# Patient Record
Sex: MALE | Race: BLACK OR AFRICAN AMERICAN | NOT HISPANIC OR LATINO | Employment: FULL TIME | URBAN - METROPOLITAN AREA
[De-identification: names, ages, dates, MRNs, and addresses within clinical notes are randomized per-mention and may not be internally consistent; named-entity substitution may affect disease eponyms.]

---

## 2023-03-22 LAB
ANION GAP IN SER/PLAS: 11 MMOL/L (ref 10–20)
CALCIUM (MG/DL) IN SER/PLAS: 9.6 MG/DL (ref 8.6–10.6)
CARBON DIOXIDE, TOTAL (MMOL/L) IN SER/PLAS: 30 MMOL/L (ref 21–32)
CHLORIDE (MMOL/L) IN SER/PLAS: 107 MMOL/L (ref 98–107)
CREATININE (MG/DL) IN SER/PLAS: 0.87 MG/DL (ref 0.5–1.3)
GFR MALE: >90 ML/MIN/1.73M2
GLUCOSE (MG/DL) IN SER/PLAS: 91 MG/DL (ref 74–99)
POTASSIUM (MMOL/L) IN SER/PLAS: 4 MMOL/L (ref 3.5–5.3)
SODIUM (MMOL/L) IN SER/PLAS: 144 MMOL/L (ref 136–145)
UREA NITROGEN (MG/DL) IN SER/PLAS: 12 MG/DL (ref 6–23)

## 2023-04-13 ENCOUNTER — TELEMEDICINE (OUTPATIENT)
Dept: PRIMARY CARE | Facility: CLINIC | Age: 68
End: 2023-04-13
Payer: MEDICARE

## 2023-04-13 DIAGNOSIS — U07.1 COVID-19: Primary | ICD-10-CM

## 2023-04-13 PROCEDURE — 99213 OFFICE O/P EST LOW 20 MIN: CPT | Performed by: INTERNAL MEDICINE

## 2023-04-13 RX ORDER — AZITHROMYCIN 250 MG/1
TABLET, FILM COATED ORAL
Qty: 6 TABLET | Refills: 0 | Status: SHIPPED | OUTPATIENT
Start: 2023-04-13 | End: 2023-04-18

## 2023-04-13 NOTE — PROGRESS NOTES
VIRTUAL VISIT    NAME OF THE PATIENT: Reid, Duane O    YOB: 1955    CHIEF COMPLAINT:  Mr. Duane Reid today came here for multiple medical issues.  Cough, yellow sputum, sinus congestion, bronchitis going on for the last several days.  He had a COVID positive a week ago.  His wife has similar issue.  He came here for follow-up.    PAST MEDICAL HISTORY:  Reviewed on EMR, unchanged.    CURRENT MEDICATIONS:  Reviewed on EMR, unchanged.  List reviewed.    ALLERGIES:  Reviewed on EMR, unchanged.    SOCIAL HISTORY:  Reviewed on EMR, unchanged.  He does not smoke and does not drink alcohol.    FAMILY HISTORY:  Reviewed on EMR, unchanged.    REVIEW OF SYSTEMS:  All 12 systems reviewed and pertaining covered in history and physical.    PHYSICAL EXAMINATION  Virtual exam.  Nose and throat congested.  Postnasal drip.  Bilateral wheezing present.    LAB WORK:  Laboratory testing discussed.    ASSESSMENT AND PLAN:  Rhinosinusitis, pharyngitis, and bronchitis.  Z-FALLON, Claritin, Robitussin, Flonase.  Supportive care.  Follow up in two weeks if not better.  Total time taken 22 minutes, more than half in direct patient care.      Kindly review this note in conjunction with EMR.   Subjective   Patient ID: Duane O Reid is a 67 y.o. male who presents for No chief complaint on file..      HPI    Review of Systems    Objective   There were no vitals taken for this visit.      Physical Exam    Assessment/Plan   Problem List Items Addressed This Visit    None  Visit Diagnoses       COVID-19    -  Primary

## 2023-05-30 DIAGNOSIS — I10 BENIGN ESSENTIAL HYPERTENSION: ICD-10-CM

## 2023-05-30 PROBLEM — H43.399 VITREOUS FLOATERS: Status: ACTIVE | Noted: 2023-05-30

## 2023-05-30 PROBLEM — N52.9 ERECTILE DYSFUNCTION: Status: ACTIVE | Noted: 2023-05-30

## 2023-05-30 PROBLEM — N41.9 PROSTATITIS: Status: ACTIVE | Noted: 2023-05-30
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
## 2023-05-30 PROBLEM — K21.9 ESOPHAGEAL REFLUX: Status: ACTIVE | Noted: 2023-05-30

## 2023-05-30 PROBLEM — M54.50 LOWER BACK PAIN: Status: ACTIVE | Noted: 2023-05-30

## 2023-05-30 PROBLEM — R35.1 NOCTURIA: Status: ACTIVE | Noted: 2023-05-30

## 2023-05-30 PROBLEM — L72.9 SCALP CYST: Status: ACTIVE | Noted: 2023-05-30

## 2023-05-30 PROBLEM — K11.8 MASS OF PAROTID GLAND: Status: ACTIVE | Noted: 2023-05-30

## 2023-05-30 PROBLEM — N40.0 BPH (BENIGN PROSTATIC HYPERPLASIA): Status: ACTIVE | Noted: 2023-05-30

## 2023-05-30 PROBLEM — E78.5 HYPERLIPIDEMIA: Status: ACTIVE | Noted: 2023-05-30

## 2023-05-30 PROBLEM — H61.899 DRY EAR CANAL: Status: ACTIVE | Noted: 2023-05-30

## 2023-05-30 PROBLEM — E55.9 VITAMIN D DEFICIENCY: Status: ACTIVE | Noted: 2023-05-30

## 2023-05-30 PROBLEM — M51.27 HERNIATED NUCLEUS PULPOSUS, L5-S1, LEFT: Status: ACTIVE | Noted: 2023-05-30

## 2023-05-30 PROBLEM — T66.XXXA ADVERSE EFFECT OF RADIATION: Status: ACTIVE | Noted: 2023-05-30

## 2023-05-30 PROBLEM — R07.9 CHEST PAIN: Status: ACTIVE | Noted: 2023-05-30

## 2023-05-30 PROBLEM — H93.8X9 LUMP OF EAR: Status: ACTIVE | Noted: 2023-05-30

## 2023-05-30 PROBLEM — T78.40XA ALLERGIC REACTION: Status: ACTIVE | Noted: 2023-05-30

## 2023-05-30 PROBLEM — H53.8 BLURRED VISION, BILATERAL: Status: ACTIVE | Noted: 2023-05-30

## 2023-05-30 PROBLEM — D64.9 ANEMIA: Status: ACTIVE | Noted: 2023-05-30

## 2023-05-30 PROBLEM — H25.10 NUCLEAR SCLEROSIS: Status: ACTIVE | Noted: 2023-05-30

## 2023-05-30 PROBLEM — H61.23 BILATERAL IMPACTED CERUMEN: Status: ACTIVE | Noted: 2023-05-30

## 2023-05-30 PROBLEM — R39.9 LOWER URINARY TRACT SYMPTOMS (LUTS): Status: ACTIVE | Noted: 2023-05-30

## 2023-05-30 PROBLEM — J32.9 SINUSITIS: Status: ACTIVE | Noted: 2023-05-30

## 2023-05-30 PROBLEM — N40.0 ENLARGED PROSTATE WITHOUT LOWER URINARY TRACT SYMPTOMS (LUTS): Status: ACTIVE | Noted: 2023-05-30

## 2023-05-30 PROBLEM — H35.372 EPIRETINAL MEMBRANE (ERM) OF LEFT EYE: Status: ACTIVE | Noted: 2023-05-30

## 2023-05-30 PROBLEM — R94.31 ABNORMAL EKG: Status: ACTIVE | Noted: 2023-05-30

## 2023-05-30 PROBLEM — M79.89 LEG SWELLING: Status: ACTIVE | Noted: 2023-05-30

## 2023-05-30 PROBLEM — E03.9 HYPOTHYROIDISM: Status: ACTIVE | Noted: 2023-05-30

## 2023-05-30 PROBLEM — C07: Status: ACTIVE | Noted: 2023-05-30

## 2023-05-30 PROBLEM — H43.813 PVD (POSTERIOR VITREOUS DETACHMENT), BOTH EYES: Status: ACTIVE | Noted: 2023-05-30

## 2023-05-30 PROBLEM — N40.1 ENLARGED PROSTATE WITH LOWER URINARY TRACT SYMPTOMS (LUTS): Status: ACTIVE | Noted: 2023-05-30

## 2023-05-30 PROBLEM — R91.1 PULMONARY NODULE: Status: ACTIVE | Noted: 2023-05-30

## 2023-05-30 PROBLEM — K44.9 HIATAL HERNIA: Status: ACTIVE | Noted: 2023-05-30

## 2023-05-30 PROBLEM — R42 DIZZINESS: Status: ACTIVE | Noted: 2023-05-30

## 2023-05-30 PROBLEM — K11.7 XEROSTOMIA DUE TO RADIOTHERAPY: Status: ACTIVE | Noted: 2023-05-30

## 2023-05-30 PROBLEM — Z97.3 WEARS EYEGLASSES: Status: ACTIVE | Noted: 2023-05-30

## 2023-05-30 PROBLEM — H18.419 ARCUS SENILIS OF EYE: Status: ACTIVE | Noted: 2023-05-30

## 2023-05-30 PROBLEM — H10.10 ALLERGIC CONJUNCTIVITIS: Status: ACTIVE | Noted: 2023-05-30

## 2023-05-30 PROBLEM — H52.7 REFRACTIVE ERROR: Status: ACTIVE | Noted: 2023-05-30

## 2023-05-30 PROBLEM — J30.9 ALLERGIC RHINITIS: Status: ACTIVE | Noted: 2023-05-30

## 2023-05-30 PROBLEM — G56.00 CARPAL TUNNEL SYNDROME: Status: ACTIVE | Noted: 2023-05-30

## 2023-05-30 PROBLEM — H60.8X3 CHRONIC ECZEMATOUS OTITIS EXTERNA OF BOTH EARS: Status: ACTIVE | Noted: 2023-05-30

## 2023-05-30 PROBLEM — N32.81 OVERACTIVE BLADDER: Status: ACTIVE | Noted: 2023-05-30

## 2023-05-30 PROBLEM — R91.8 LUNG NODULE, MULTIPLE: Status: ACTIVE | Noted: 2023-05-30

## 2023-05-30 PROBLEM — H26.9 CATARACTS, BOTH EYES: Status: ACTIVE | Noted: 2023-05-30

## 2023-05-30 RX ORDER — DUTASTERIDE 0.5 MG/1
0.5 CAPSULE, LIQUID FILLED ORAL DAILY
COMMUNITY
End: 2023-05-30 | Stop reason: SDUPTHER

## 2023-05-30 RX ORDER — DUTASTERIDE 0.5 MG/1
0.5 CAPSULE, LIQUID FILLED ORAL DAILY
Qty: 30 CAPSULE | Refills: 0 | Status: SHIPPED | OUTPATIENT
Start: 2023-05-30 | End: 2023-07-03 | Stop reason: SDUPTHER

## 2023-06-07 LAB
ALANINE AMINOTRANSFERASE (SGPT) (U/L) IN SER/PLAS: 17 U/L (ref 10–52)
ALBUMIN (G/DL) IN SER/PLAS: 4.3 G/DL (ref 3.4–5)
ALKALINE PHOSPHATASE (U/L) IN SER/PLAS: 49 U/L (ref 33–136)
ANION GAP IN SER/PLAS: 12 MMOL/L (ref 10–20)
ASPARTATE AMINOTRANSFERASE (SGOT) (U/L) IN SER/PLAS: 14 U/L (ref 9–39)
BILIRUBIN TOTAL (MG/DL) IN SER/PLAS: 1.5 MG/DL (ref 0–1.2)
CALCIUM (MG/DL) IN SER/PLAS: 9.4 MG/DL (ref 8.6–10.6)
CARBON DIOXIDE, TOTAL (MMOL/L) IN SER/PLAS: 28 MMOL/L (ref 21–32)
CHLORIDE (MMOL/L) IN SER/PLAS: 107 MMOL/L (ref 98–107)
CHOLESTEROL (MG/DL) IN SER/PLAS: 183 MG/DL (ref 0–199)
CHOLESTEROL IN HDL (MG/DL) IN SER/PLAS: 66.6 MG/DL
CHOLESTEROL/HDL RATIO: 2.7
CREATININE (MG/DL) IN SER/PLAS: 0.87 MG/DL (ref 0.5–1.3)
GFR MALE: >90 ML/MIN/1.73M2
GLUCOSE (MG/DL) IN SER/PLAS: 106 MG/DL (ref 74–99)
LDL: 103 MG/DL (ref 0–99)
POTASSIUM (MMOL/L) IN SER/PLAS: 4 MMOL/L (ref 3.5–5.3)
PROTEIN TOTAL: 6.6 G/DL (ref 6.4–8.2)
SODIUM (MMOL/L) IN SER/PLAS: 143 MMOL/L (ref 136–145)
TRIGLYCERIDE (MG/DL) IN SER/PLAS: 68 MG/DL (ref 0–149)
UREA NITROGEN (MG/DL) IN SER/PLAS: 19 MG/DL (ref 6–23)
VLDL: 14 MG/DL (ref 0–40)

## 2023-06-15 ENCOUNTER — TELEPHONE (OUTPATIENT)
Dept: PRIMARY CARE | Facility: CLINIC | Age: 68
End: 2023-06-15
Payer: MEDICARE

## 2023-07-03 DIAGNOSIS — I10 BENIGN ESSENTIAL HYPERTENSION: ICD-10-CM

## 2023-07-03 RX ORDER — DUTASTERIDE 0.5 MG/1
0.5 CAPSULE, LIQUID FILLED ORAL DAILY
Qty: 30 CAPSULE | Refills: 0 | Status: SHIPPED | OUTPATIENT
Start: 2023-07-03 | End: 2023-10-19 | Stop reason: ALTCHOICE

## 2023-09-01 ENCOUNTER — OFFICE VISIT (OUTPATIENT)
Dept: PRIMARY CARE | Facility: CLINIC | Age: 68
End: 2023-09-01
Payer: MEDICARE

## 2023-09-01 ENCOUNTER — LAB (OUTPATIENT)
Dept: LAB | Facility: LAB | Age: 68
End: 2023-09-01
Payer: MEDICARE

## 2023-09-01 VITALS
BODY MASS INDEX: 30.05 KG/M2 | WEIGHT: 187 LBS | HEIGHT: 66 IN | DIASTOLIC BLOOD PRESSURE: 72 MMHG | SYSTOLIC BLOOD PRESSURE: 122 MMHG

## 2023-09-01 DIAGNOSIS — E78.5 HYPERLIPIDEMIA, UNSPECIFIED HYPERLIPIDEMIA TYPE: ICD-10-CM

## 2023-09-01 DIAGNOSIS — R73.03 PRE-DIABETES: ICD-10-CM

## 2023-09-01 DIAGNOSIS — I10 BENIGN ESSENTIAL HYPERTENSION: ICD-10-CM

## 2023-09-01 DIAGNOSIS — M19.90 ARTHRITIS: ICD-10-CM

## 2023-09-01 DIAGNOSIS — E03.9 HYPOTHYROIDISM, UNSPECIFIED TYPE: ICD-10-CM

## 2023-09-01 DIAGNOSIS — R51.9 INTRACTABLE HEADACHE, UNSPECIFIED CHRONICITY PATTERN, UNSPECIFIED HEADACHE TYPE: ICD-10-CM

## 2023-09-01 LAB
ALANINE AMINOTRANSFERASE (SGPT) (U/L) IN SER/PLAS: 17 U/L (ref 10–52)
ALBUMIN (G/DL) IN SER/PLAS: 4.1 G/DL (ref 3.4–5)
ALKALINE PHOSPHATASE (U/L) IN SER/PLAS: 61 U/L (ref 33–136)
ANION GAP IN SER/PLAS: 11 MMOL/L (ref 10–20)
APPEARANCE, URINE: CLEAR
ASPARTATE AMINOTRANSFERASE (SGOT) (U/L) IN SER/PLAS: 17 U/L (ref 9–39)
BILIRUBIN TOTAL (MG/DL) IN SER/PLAS: 1.2 MG/DL (ref 0–1.2)
BILIRUBIN, URINE: NEGATIVE
BLOOD, URINE: NEGATIVE
CALCIUM (MG/DL) IN SER/PLAS: 9.8 MG/DL (ref 8.6–10.6)
CARBON DIOXIDE, TOTAL (MMOL/L) IN SER/PLAS: 29 MMOL/L (ref 21–32)
CHLORIDE (MMOL/L) IN SER/PLAS: 107 MMOL/L (ref 98–107)
CHOLESTEROL (MG/DL) IN SER/PLAS: 158 MG/DL (ref 0–199)
CHOLESTEROL IN HDL (MG/DL) IN SER/PLAS: 59.3 MG/DL
CHOLESTEROL/HDL RATIO: 2.7
COLOR, URINE: YELLOW
CREATININE (MG/DL) IN SER/PLAS: 0.8 MG/DL (ref 0.5–1.3)
ERYTHROCYTE DISTRIBUTION WIDTH (RATIO) BY AUTOMATED COUNT: 12.3 % (ref 11.5–14.5)
ERYTHROCYTE MEAN CORPUSCULAR HEMOGLOBIN CONCENTRATION (G/DL) BY AUTOMATED: 32.3 G/DL (ref 32–36)
ERYTHROCYTE MEAN CORPUSCULAR VOLUME (FL) BY AUTOMATED COUNT: 87 FL (ref 80–100)
ERYTHROCYTES (10*6/UL) IN BLOOD BY AUTOMATED COUNT: 4.28 X10E12/L (ref 4.5–5.9)
ESTIMATED AVERAGE GLUCOSE FOR HBA1C: 91 MG/DL
GFR MALE: >90 ML/MIN/1.73M2
GLUCOSE (MG/DL) IN SER/PLAS: 99 MG/DL (ref 74–99)
GLUCOSE, URINE: NEGATIVE MG/DL
HEMATOCRIT (%) IN BLOOD BY AUTOMATED COUNT: 37.1 % (ref 41–52)
HEMOGLOBIN (G/DL) IN BLOOD: 12 G/DL (ref 13.5–17.5)
HEMOGLOBIN A1C/HEMOGLOBIN TOTAL IN BLOOD: 4.8 %
KETONES, URINE: NEGATIVE MG/DL
LDL: 82 MG/DL (ref 0–99)
LEUKOCYTE ESTERASE, URINE: NEGATIVE
LEUKOCYTES (10*3/UL) IN BLOOD BY AUTOMATED COUNT: 4.7 X10E9/L (ref 4.4–11.3)
MUCUS, URINE: NORMAL /LPF
NITRITE, URINE: NEGATIVE
NRBC (PER 100 WBCS) BY AUTOMATED COUNT: 0 /100 WBC (ref 0–0)
PH, URINE: 6 (ref 5–8)
PLATELETS (10*3/UL) IN BLOOD AUTOMATED COUNT: 241 X10E9/L (ref 150–450)
POTASSIUM (MMOL/L) IN SER/PLAS: 4.2 MMOL/L (ref 3.5–5.3)
PROTEIN TOTAL: 6.3 G/DL (ref 6.4–8.2)
PROTEIN, URINE: ABNORMAL MG/DL
RBC, URINE: <1 /HPF (ref 0–5)
RHEUMATOID FACTOR (IU/ML) IN SERUM OR PLASMA: <10 IU/ML (ref 0–15)
SEDIMENTATION RATE, ERYTHROCYTE: 8 MM/H (ref 0–20)
SODIUM (MMOL/L) IN SER/PLAS: 143 MMOL/L (ref 136–145)
SPECIFIC GRAVITY, URINE: 1.01 (ref 1–1.03)
THYROTROPIN (MIU/L) IN SER/PLAS BY DETECTION LIMIT <= 0.05 MIU/L: 1.02 MIU/L (ref 0.44–3.98)
TRIGLYCERIDE (MG/DL) IN SER/PLAS: 85 MG/DL (ref 0–149)
URATE (MG/DL) IN SER/PLAS: 3.1 MG/DL (ref 4–7.5)
UREA NITROGEN (MG/DL) IN SER/PLAS: 9 MG/DL (ref 6–23)
UROBILINOGEN, URINE: <2 MG/DL (ref 0–1.9)
VLDL: 17 MG/DL (ref 0–40)
WBC, URINE: <1 /HPF (ref 0–5)

## 2023-09-01 PROCEDURE — 84443 ASSAY THYROID STIM HORMONE: CPT

## 2023-09-01 PROCEDURE — 80053 COMPREHEN METABOLIC PANEL: CPT

## 2023-09-01 PROCEDURE — 36415 COLL VENOUS BLD VENIPUNCTURE: CPT

## 2023-09-01 PROCEDURE — 85027 COMPLETE CBC AUTOMATED: CPT

## 2023-09-01 PROCEDURE — 3078F DIAST BP <80 MM HG: CPT | Performed by: INTERNAL MEDICINE

## 2023-09-01 PROCEDURE — 86038 ANTINUCLEAR ANTIBODIES: CPT

## 2023-09-01 PROCEDURE — 3074F SYST BP LT 130 MM HG: CPT | Performed by: INTERNAL MEDICINE

## 2023-09-01 PROCEDURE — 83036 HEMOGLOBIN GLYCOSYLATED A1C: CPT

## 2023-09-01 PROCEDURE — 1159F MED LIST DOCD IN RCRD: CPT | Performed by: INTERNAL MEDICINE

## 2023-09-01 PROCEDURE — 86431 RHEUMATOID FACTOR QUANT: CPT

## 2023-09-01 PROCEDURE — 80061 LIPID PANEL: CPT

## 2023-09-01 PROCEDURE — 85652 RBC SED RATE AUTOMATED: CPT

## 2023-09-01 PROCEDURE — 1126F AMNT PAIN NOTED NONE PRSNT: CPT | Performed by: INTERNAL MEDICINE

## 2023-09-01 PROCEDURE — 84550 ASSAY OF BLOOD/URIC ACID: CPT

## 2023-09-01 PROCEDURE — 99214 OFFICE O/P EST MOD 30 MIN: CPT | Performed by: INTERNAL MEDICINE

## 2023-09-01 PROCEDURE — 81001 URINALYSIS AUTO W/SCOPE: CPT

## 2023-09-01 ASSESSMENT — ENCOUNTER SYMPTOMS
PARESIS: 0
TINGLING: 1
NUMBNESS: 1
FEVER: 0
BOWEL INCONTINENCE: 0
PARESTHESIAS: 1
OCCASIONAL FEELINGS OF UNSTEADINESS: 0
WEAKNESS: 0
LOSS OF SENSATION IN FEET: 0
PERIANAL NUMBNESS: 0
WEIGHT LOSS: 0
BACK PAIN: 1
DYSURIA: 0
ABDOMINAL PAIN: 0
HEADACHES: 0
DEPRESSION: 0
LEG PAIN: 1

## 2023-09-01 NOTE — PROGRESS NOTES
OFFICE NOTE    NAME OF THE PATIENT: Reid, Duane O    YOB: 1955    CHIEF COMPLAINT:  Duane Reid today came here for multiple medical issues.  He is waking up three to four times for pee-pee, Avodart not helping.  He is going for reverse shoulder surgery with Dr. Hsieh.  He had a two head injuries with history of cancer.  He is worried about it.  He gets headache on and off.  He sees blue lights.  He hit the head a couple of times.  It is not a Workmen Comp Case.  He came here for follow-up on various conditions.    PAST MEDICAL HISTORY:  Reviewed on EMR, unchanged.    CURRENT MEDICATIONS:  Reviewed on EMR, unchanged.  List reviewed.    ALLERGIES:  Reviewed on EMR, unchanged.    SOCIAL HISTORY:  Reviewed on EMR, unchanged.  He does not smoke and does not drink alcohol.    FAMILY HISTORY:  Reviewed on EMR, unchanged.    REVIEW OF SYSTEMS:  All 12 systems reviewed and pertaining covered in history and physical.    PHYSICAL EXAMINATION  VITAL SIGNS:  As recorded and reviewed from EMR.  RESPIRATORY:  The patient had normal inspirations and expirations.  The breath sounds were equal bilaterally and clear to auscultation.  CARDIOVASCULAR:  The patient had S1 normal, split S2 without obvious rubs, clicks, or murmurs.    GASTROINTESTINAL:  There was no hepatosplenomegaly.  There were no palpable masses and no inguinal nodes.  EXTREMITIES:  Legs had no edema.  NEUROLOGIC:  The patient had normal cranial nerves.  The reflexes, sensory, and motor examination were grossly within normal limits.    LAB WORK:  Laboratory testing discussed.    ASSESSMENT AND PLAN:  Head trauma, history of cancer.  MRI ordered.  No brain mets.  I doubt those head injuries cause the problem.  BPH needing surgery.  I think he can proceed with TURP.  Continue medication.  Flomax.  High cholesterol.  He is on Crestor, restart.  He does not believe his generalized pain is because of cholesterol pill.  He is off the cholesterol pill for  "two to three days now and no benefit of that.  GERD, on PPI.  Complete blood work ordered.  Arthritis.  Blood work ordered.  He will see Dr. Hsieh.  Polypharmacy review.  He is on rosuvastatin, Flomax, Avodart, pantoprazole, calcium, and vitamin D.  I shall see him back in a couple of weeks after test.      Kindly review this note in conjunction with EMR.   Subjective   Patient ID: Duane O Reid is a 68 y.o. male who presents for Follow-up.      HPI    Review of Systems    Objective   /72   Ht 1.676 m (5' 6\")   Wt 84.8 kg (187 lb)   BMI 30.18 kg/m²       Physical Exam    Assessment/Plan   Problem List Items Addressed This Visit    None        "

## 2023-09-05 LAB — ANTI-NUCLEAR ANTIBODY (ANA): NEGATIVE

## 2023-09-21 ENCOUNTER — TELEPHONE (OUTPATIENT)
Dept: PRIMARY CARE | Facility: CLINIC | Age: 68
End: 2023-09-21
Payer: MEDICARE

## 2023-09-29 ENCOUNTER — APPOINTMENT (OUTPATIENT)
Dept: PRIMARY CARE | Facility: CLINIC | Age: 68
End: 2023-09-29
Payer: MEDICARE

## 2023-10-06 ENCOUNTER — OFFICE VISIT (OUTPATIENT)
Dept: PRIMARY CARE | Facility: CLINIC | Age: 68
End: 2023-10-06
Payer: MEDICARE

## 2023-10-06 VITALS
WEIGHT: 188 LBS | SYSTOLIC BLOOD PRESSURE: 140 MMHG | DIASTOLIC BLOOD PRESSURE: 78 MMHG | HEIGHT: 66 IN | BODY MASS INDEX: 30.22 KG/M2

## 2023-10-06 DIAGNOSIS — R73.03 PRE-DIABETES: ICD-10-CM

## 2023-10-06 DIAGNOSIS — N40.1 BENIGN PROSTATIC HYPERPLASIA WITH LOWER URINARY TRACT SYMPTOMS, SYMPTOM DETAILS UNSPECIFIED: Primary | ICD-10-CM

## 2023-10-06 DIAGNOSIS — E03.9 HYPOTHYROIDISM, UNSPECIFIED TYPE: ICD-10-CM

## 2023-10-06 DIAGNOSIS — I10 BENIGN ESSENTIAL HYPERTENSION: ICD-10-CM

## 2023-10-06 DIAGNOSIS — K21.9 GASTROESOPHAGEAL REFLUX DISEASE, UNSPECIFIED WHETHER ESOPHAGITIS PRESENT: ICD-10-CM

## 2023-10-06 DIAGNOSIS — M19.90 ARTHRITIS: ICD-10-CM

## 2023-10-06 DIAGNOSIS — E78.5 HYPERLIPIDEMIA, UNSPECIFIED HYPERLIPIDEMIA TYPE: ICD-10-CM

## 2023-10-06 PROCEDURE — 99214 OFFICE O/P EST MOD 30 MIN: CPT | Performed by: INTERNAL MEDICINE

## 2023-10-06 PROCEDURE — 3078F DIAST BP <80 MM HG: CPT | Performed by: INTERNAL MEDICINE

## 2023-10-06 PROCEDURE — 1126F AMNT PAIN NOTED NONE PRSNT: CPT | Performed by: INTERNAL MEDICINE

## 2023-10-06 PROCEDURE — 1159F MED LIST DOCD IN RCRD: CPT | Performed by: INTERNAL MEDICINE

## 2023-10-06 PROCEDURE — 3077F SYST BP >= 140 MM HG: CPT | Performed by: INTERNAL MEDICINE

## 2023-10-06 RX ORDER — CHOLECALCIFEROL (VITAMIN D3) 125 MCG
125 CAPSULE ORAL DAILY
COMMUNITY
End: 2023-10-19 | Stop reason: ALTCHOICE

## 2023-10-06 RX ORDER — ROSUVASTATIN CALCIUM 10 MG/1
10 TABLET, COATED ORAL DAILY
COMMUNITY
End: 2023-10-19 | Stop reason: ALTCHOICE

## 2023-10-06 RX ORDER — TAMSULOSIN HYDROCHLORIDE 0.4 MG/1
CAPSULE ORAL
COMMUNITY
End: 2024-01-30 | Stop reason: HOSPADM

## 2023-10-06 RX ORDER — PANTOPRAZOLE SODIUM 40 MG/1
TABLET, DELAYED RELEASE ORAL
COMMUNITY
End: 2023-10-19 | Stop reason: ALTCHOICE

## 2023-10-06 RX ORDER — ASPIRIN 81 MG/1
1 TABLET ORAL DAILY
COMMUNITY
Start: 2020-07-09 | End: 2024-02-29 | Stop reason: WASHOUT

## 2023-10-06 ASSESSMENT — ENCOUNTER SYMPTOMS
DEPRESSION: 0
LOSS OF SENSATION IN FEET: 0
OCCASIONAL FEELINGS OF UNSTEADINESS: 0

## 2023-10-06 NOTE — PROGRESS NOTES
"Subjective   Patient ID: Duane O Reid is a 68 y.o. male who presents for Follow-up (multiple medical issues).    This gentleman today came here for multiple medical issues.  1. He sees urologist.  There is a plan to take his prostate out.  He is waking up two to three times at night for pee-pee.  He is not taking Avodart.  2. Follow-up on blood work and other conditions.    I have personally reviewed the patient's Past Medical History, Medications, Allergies, Social History, and Family History in the EMR.    Review of Systems   All other systems reviewed and are negative.    Objective   /78   Ht 1.676 m (5' 6\")   Wt 85.3 kg (188 lb)   BMI 30.34 kg/m²     Physical Exam  Vitals reviewed.   Cardiovascular:      Heart sounds: Normal heart sounds, S1 normal and S2 normal. No murmur heard.     No friction rub.   Pulmonary:      Effort: Pulmonary effort is normal.      Breath sounds: Normal breath sounds and air entry.   Abdominal:      Palpations: There is no hepatomegaly, splenomegaly or mass.   Musculoskeletal:      Right lower leg: No edema.      Left lower leg: No edema.   Lymphadenopathy:      Lower Body: No right inguinal adenopathy. No left inguinal adenopathy.   Neurological:      Cranial Nerves: Cranial nerves 2-12 are intact.      Sensory: No sensory deficit.      Motor: Motor function is intact.      Deep Tendon Reflexes: Reflexes are normal and symmetric.     LAB WORK: Laboratory testing discussed.    Assessment/Plan   Problem List Items Addressed This Visit             ICD-10-CM       Cardiac and Vasculature    Benign essential hypertension I10    Relevant Orders    Comprehensive Metabolic Panel    Hyperlipidemia E78.5    Relevant Orders    Lipid Panel       Endocrine/Metabolic    Hypothyroidism E03.9    Relevant Orders    Thyroid Stimulating Hormone       Gastrointestinal and Abdominal    Esophageal reflux K21.9       Genitourinary and Reproductive    BPH (benign prostatic hyperplasia) - Primary " N40.0     Other Visit Diagnoses         Codes    Pre-diabetes     R73.03    Arthritis     M19.90    Relevant Orders    Referral to Rheumatology        1. Prostate health.  Avodart is stopped.  I agreed with the surgery, good decision.  2. GERD, on PPI.  3. Hypertension, okay.  4. Cholesterol.  Diet and exercise.  Doing good job.  5. Arthritis, not under control.  A lot of arthritis.  Refer to arthritis specialist.  6. Follow up with me in five to six weeks.  7. If his arthritis is not better, referral was not put in.  I am going to refer to Dr. Heard for systemic arthritis.    Scribe Attestation  By signing my name below, I, Litzy Harris attest that this documentation has been prepared under the direction and in the presence of Monica Parnell MD.

## 2023-10-18 PROBLEM — E53.9 VITAMIN B DEFICIENCY: Status: ACTIVE | Noted: 2023-10-18

## 2023-10-18 PROBLEM — E66.811 OBESITY, CLASS I, BMI 30-34.9: Status: ACTIVE | Noted: 2023-10-18

## 2023-10-18 PROBLEM — E66.9 OBESITY, CLASS I, BMI 30-34.9: Status: ACTIVE | Noted: 2023-10-18

## 2023-10-18 PROBLEM — R22.0 LOCALIZED SWELLING, MASS AND LUMP, HEAD: Status: ACTIVE | Noted: 2023-10-18

## 2023-10-18 RX ORDER — LORATADINE 10 MG/1
10 TABLET ORAL DAILY PRN
COMMUNITY

## 2023-10-18 RX ORDER — IBUPROFEN 600 MG/1
600 TABLET ORAL 3 TIMES DAILY PRN
COMMUNITY
End: 2024-02-21 | Stop reason: ALTCHOICE

## 2023-10-19 ENCOUNTER — OFFICE VISIT (OUTPATIENT)
Dept: GASTROENTEROLOGY | Facility: CLINIC | Age: 68
End: 2023-10-19
Payer: MEDICARE

## 2023-10-19 VITALS — BODY MASS INDEX: 29.57 KG/M2 | WEIGHT: 184 LBS | HEIGHT: 66 IN

## 2023-10-19 DIAGNOSIS — K21.9 GASTROESOPHAGEAL REFLUX DISEASE WITHOUT ESOPHAGITIS: Primary | ICD-10-CM

## 2023-10-19 DIAGNOSIS — K59.00 CONSTIPATION, UNSPECIFIED CONSTIPATION TYPE: ICD-10-CM

## 2023-10-19 PROCEDURE — 1126F AMNT PAIN NOTED NONE PRSNT: CPT | Performed by: INTERNAL MEDICINE

## 2023-10-19 PROCEDURE — 1159F MED LIST DOCD IN RCRD: CPT | Performed by: INTERNAL MEDICINE

## 2023-10-19 PROCEDURE — 99214 OFFICE O/P EST MOD 30 MIN: CPT | Performed by: INTERNAL MEDICINE

## 2023-10-19 PROCEDURE — 1036F TOBACCO NON-USER: CPT | Performed by: INTERNAL MEDICINE

## 2023-10-19 RX ORDER — PANTOPRAZOLE SODIUM 40 MG/1
40 TABLET, DELAYED RELEASE ORAL DAILY
Qty: 90 TABLET | Refills: 3 | Status: SHIPPED | OUTPATIENT
Start: 2023-10-19 | End: 2024-10-18

## 2023-10-19 NOTE — PROGRESS NOTES
REASON FOR VISIT: Follow-up reflux    HPI:  Duane O Reid is a 68 y.o. male with a past medical history of chronic GERD, BPH, hypothyroidism being evaluated in the office for chronic GERD.  Has been chronically on pantoprazole 40 mg once daily.  This has for the most part controlled his heartburn symptoms.  Occasional breakthrough symptoms if he eats meals late at night or hot fattier foods in the evening.  No dysphagia or dyne aphasia.  Occasional irregular bowel movements with occasional straining or constipation.  Feels that it may be related to his activity.  Colonoscopy done within the last 2 years without significant findings.  No melena or bright red blood per rectum.          PRIOR ENDOSCOPY    PAST MEDICAL HISTORY  Past Medical History:   Diagnosis Date    Impacted cerumen, bilateral 05/30/2022    Bilateral impacted cerumen    Other conditions influencing health status     Arthritis    Other vitreous opacities, unspecified eye     Vitreous floaters    Pain in unspecified knee 08/29/2013    Joint pain, knee    Personal history of other diseases of male genital organs     History of prostate disorder    Personal history of other diseases of male genital organs     History of benign prostatic hypertrophy    Personal history of other diseases of the digestive system     History of gastroesophageal reflux (GERD)    Personal history of other diseases of the musculoskeletal system and connective tissue 08/29/2013    History of backache    Personal history of other diseases of the nervous system and sense organs     History of sleep apnea    Personal history of other diseases of the nervous system and sense organs     History of deafness    Personal history of other diseases of the respiratory system 02/06/2014    History of acute bronchitis    Personal history of other specified conditions 10/24/2014    History of urinary frequency    Personal history of other specified conditions 01/31/2014    History of weakness     Pure hypercholesterolemia, unspecified 08/29/2013    Low-density-lipoid-type (LDL) hyperlipoproteinemia    Sixth (abducent) nerve palsy, unspecified eye     Sixth nerve palsy       PAST SURGICAL HISTORY  Past Surgical History:   Procedure Laterality Date    TOTAL KNEE ARTHROPLASTY  08/29/2013    Knee Replacement       FAMILY HISTORY  Family History   Problem Relation Name Age of Onset    Eczema Mother      Diabetes type II Father         SOCIAL HISTORY  Social History     Tobacco Use    Smoking status: Never     Passive exposure: Never    Smokeless tobacco: Never   Substance Use Topics    Alcohol use: Never       REVIEW OF SYSTEMS  CONSTITUTIONAL: negative for fever, chills, fatigue, or unintentional weight loss,   HEENT: negative for icteric sclera, eye pain/redness, or changes in vision/hearing  RESPIRATORY: negative for cough, hemoptysis, wheezing, orthopnea, or dyspnea on exertion  CARDIOVASCULAR: negative for chest pain, palpitations, or syncope   GASTROINTESTINAL: as noted per HPI  GENITOURINARY: negative for dysuria, polyuria, incontinence, or hematuria  MUSCULOSKELETAL: negative for arthralgia, myalgia, or joint swelling/stiffness   INTEGUMENTARY/SKIN: negative jaundice, rash, or skin lesion  HEMATOLOGIC/LYMPHATIC: negative for prolonged bleeding, easy bruising, or swollen lymph nodes  ENDOCRINE: negative for cold/heat intolerance, polydipsia, polyuria, or goiter  NEUROLOGIC: negative for headaches, dizziness, tremor, or gait abnormality  PSYCHIATRIC: negative for anxiety, depression, personality changes, or sleep disturbances      A 10 point review of systems was completed and was otherwise negative.    ALLERGIES  Allergies   Allergen Reactions    Naproxen Other    Niacin Other       MEDICATIONS  Current Outpatient Medications   Medication Sig Dispense Refill    aspirin 81 mg EC tablet Take 1 tablet (81 mg) by mouth once daily.      folic acid/multivit-min/lutein (CENTRUM SILVER ORAL) Take 1 tablet by  "mouth once daily.      ibuprofen 600 mg tablet Take 1 tablet (600 mg) by mouth 3 times a day as needed for mild pain (1 - 3).      loratadine (Claritin) 10 mg tablet Take 1 tablet (10 mg) by mouth once daily.      tamsulosin (Flomax) 0.4 mg 24 hr capsule TAKE ONE CAPSULE BY MOUTH IN THE MORNING AND ONE AT NIGHT.      pantoprazole (ProtoNix) 40 mg EC tablet Take 1 tablet (40 mg) by mouth once daily. Do not crush, chew, or split. 90 tablet 3     No current facility-administered medications for this visit.       VITALS  Ht 1.676 m (5' 6\")   Wt 83.5 kg (184 lb)   BMI 29.70 kg/m²      PHYSICAL EXAM  Alert and oriented in no acute distress  Abdomen soft and no focal tenderness to palpation, no hepatosplenomegaly    ASSESSMENT/ PLAN  Patient with chronic GERD with occasional breakthrough symptoms.  We will continue pantoprazole.  Emphasized the importance of lifestyle modifications including staying upright 2 hours after meals as well as avoiding fatty foods in the evening.  Occasional constipation likely related to his low fiber intake.  Recommend starting fiber supplementation once daily.  He is in agreement with the plan.  Will be due for surveillance colonoscopy in 2026.        Signature: Carmen Graves MD    Date: 10/19/2023  Time: 3:15 PM    "

## 2023-11-03 ENCOUNTER — OFFICE VISIT (OUTPATIENT)
Dept: RHEUMATOLOGY | Facility: CLINIC | Age: 68
End: 2023-11-03
Payer: MEDICARE

## 2023-11-03 ENCOUNTER — OFFICE VISIT (OUTPATIENT)
Dept: UROLOGY | Facility: HOSPITAL | Age: 68
End: 2023-11-03
Payer: MEDICARE

## 2023-11-03 VITALS — WEIGHT: 185 LBS | DIASTOLIC BLOOD PRESSURE: 68 MMHG | BODY MASS INDEX: 29.86 KG/M2 | SYSTOLIC BLOOD PRESSURE: 138 MMHG

## 2023-11-03 DIAGNOSIS — M85.89 OTHER SPECIFIED DISORDERS OF BONE DENSITY AND STRUCTURE, MULTIPLE SITES: ICD-10-CM

## 2023-11-03 DIAGNOSIS — M19.90 ARTHRITIS: Primary | ICD-10-CM

## 2023-11-03 DIAGNOSIS — N39.41 URGE INCONTINENCE OF URINE: ICD-10-CM

## 2023-11-03 DIAGNOSIS — N40.1 BENIGN PROSTATIC HYPERPLASIA WITH LOWER URINARY TRACT SYMPTOMS, SYMPTOM DETAILS UNSPECIFIED: Primary | ICD-10-CM

## 2023-11-03 PROCEDURE — 1159F MED LIST DOCD IN RCRD: CPT | Performed by: INTERNAL MEDICINE

## 2023-11-03 PROCEDURE — 1126F AMNT PAIN NOTED NONE PRSNT: CPT | Performed by: INTERNAL MEDICINE

## 2023-11-03 PROCEDURE — 99214 OFFICE O/P EST MOD 30 MIN: CPT | Performed by: UROLOGY

## 2023-11-03 PROCEDURE — 3078F DIAST BP <80 MM HG: CPT | Performed by: UROLOGY

## 2023-11-03 PROCEDURE — 3075F SYST BP GE 130 - 139MM HG: CPT | Performed by: UROLOGY

## 2023-11-03 PROCEDURE — 1036F TOBACCO NON-USER: CPT | Performed by: UROLOGY

## 2023-11-03 PROCEDURE — 1036F TOBACCO NON-USER: CPT | Performed by: INTERNAL MEDICINE

## 2023-11-03 PROCEDURE — 1160F RVW MEDS BY RX/DR IN RCRD: CPT | Performed by: UROLOGY

## 2023-11-03 PROCEDURE — 1126F AMNT PAIN NOTED NONE PRSNT: CPT | Performed by: UROLOGY

## 2023-11-03 PROCEDURE — 99204 OFFICE O/P NEW MOD 45 MIN: CPT | Performed by: INTERNAL MEDICINE

## 2023-11-03 PROCEDURE — 99214 OFFICE O/P EST MOD 30 MIN: CPT | Mod: PO | Performed by: INTERNAL MEDICINE

## 2023-11-03 PROCEDURE — 3078F DIAST BP <80 MM HG: CPT | Performed by: INTERNAL MEDICINE

## 2023-11-03 PROCEDURE — 3075F SYST BP GE 130 - 139MM HG: CPT | Performed by: INTERNAL MEDICINE

## 2023-11-03 PROCEDURE — 1160F RVW MEDS BY RX/DR IN RCRD: CPT | Performed by: INTERNAL MEDICINE

## 2023-11-03 PROCEDURE — 1159F MED LIST DOCD IN RCRD: CPT | Performed by: UROLOGY

## 2023-11-03 NOTE — PROGRESS NOTES
HPI  68 year old male with history of BPH and poorly controlled LUTS with Tamsulosin 0.4mg.      1/23/23 RBUS - Suspected enlarged prostate is incompletely visualized.     PSA 1/2023 - 3.3     1/3/22 MRI prostate - No evidence of clinically significant prostate cancer. No pelvic lymphadenopathy. Prostate is approx 39g     RBUS 1/23/2023 - PVR 106cc     1/31/23 -Seen in consult. Reports frequency and urgency every 2 hours during the day, nocturia 4x at night and voids large volumes. Incomplete bladder emptying. Stream is strong on tamsulosin once or twice during the day, weak off flomax. Recently started Vesicare. Denies any hematuria or UTI. Was previously seeing another urologist who recommended Urolift, would not like to get this done. drinks a lot of water day and night.      4/5/23 - here for cyssto. urgency, frequency no better. Cysto revealed trilobar, very obstructive, moderately sized prostate.         11/03/2023 - seen today to discuss surgery, per pt request. He is interested in a TURP.    Lab Results   Component Value Date    PSA 3.43 05/02/2022    PSA 3.9 12/02/2021    PSA 4.0 09/09/2021     Current Medications:  Current Outpatient Medications   Medication Sig Dispense Refill    aspirin 81 mg EC tablet Take 1 tablet (81 mg) by mouth once daily.      folic acid/multivit-min/lutein (CENTRUM SILVER ORAL) Take 1 tablet by mouth once daily.      ibuprofen 600 mg tablet Take 1 tablet (600 mg) by mouth 3 times a day as needed for mild pain (1 - 3).      loratadine (Claritin) 10 mg tablet Take 1 tablet (10 mg) by mouth once daily.      pantoprazole (ProtoNix) 40 mg EC tablet Take 1 tablet (40 mg) by mouth once daily. Do not crush, chew, or split. 90 tablet 3    tamsulosin (Flomax) 0.4 mg 24 hr capsule TAKE ONE CAPSULE BY MOUTH IN THE MORNING AND ONE AT NIGHT.       No current facility-administered medications for this visit.        Active Problems:  Duane O Reid is a 68 y.o. male with the following Problems and  Medications.  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)    Adverse effect of radiation    Allergic conjunctivitis    Allergic reaction    Allergic rhinitis    Anemia    Arcus senilis of eye    Benign essential hypertension    Bilateral impacted cerumen    Blurred vision, bilateral    Carpal tunnel syndrome    Cataracts, both eyes    Chest pain    Chronic eczematous otitis externa of both ears    Dizziness    Dry ear canal    BPH (benign prostatic hyperplasia)    Enlarged prostate with lower urinary tract symptoms (LUTS)    Enlarged prostate without lower urinary tract symptoms (luts)    Epiretinal membrane (ERM) of left eye    Erectile dysfunction    Hearing loss    Herniated nucleus pulposus, L5-S1, left    Esophageal reflux    Hiatal hernia    Hypothyroidism    Hyperlipidemia    Leg swelling    Lower back pain    Lower urinary tract symptoms (LUTS)    Lump of ear    Mass of parotid gland    Nocturia    Nuclear sclerosis    Overactive bladder    Prostatitis    Lung nodule, multiple    Pulmonary nodule    PVD (posterior vitreous detachment), both eyes    Refractive error    Scalp cyst    Sinusitis    Vitamin D deficiency    Vitreous floaters    Wears eyeglasses    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      folic acid/multivit-min/lutein (CENTRUM SILVER ORAL) Take 1 tablet by mouth once daily.      ibuprofen 600 mg tablet Take 1 tablet (600 mg) by mouth 3 times a day as needed for mild pain (1 - 3).      loratadine (Claritin) 10 mg tablet Take 1 tablet (10 mg) by mouth once daily.      pantoprazole (ProtoNix) 40 mg EC tablet Take 1 tablet (40 mg) by mouth once daily. Do not crush, chew, or  split. 90 tablet 3    tamsulosin (Flomax) 0.4 mg 24 hr capsule TAKE ONE CAPSULE BY MOUTH IN THE MORNING AND ONE AT NIGHT.       No current facility-administered medications for this visit.       PMH:  Past Medical History:   Diagnosis Date    Impacted cerumen, bilateral 05/30/2022    Bilateral impacted cerumen    Other conditions influencing health status     Arthritis    Other vitreous opacities, unspecified eye     Vitreous floaters    Pain in unspecified knee 08/29/2013    Joint pain, knee    Personal history of other diseases of male genital organs     History of prostate disorder    Personal history of other diseases of male genital organs     History of benign prostatic hypertrophy    Personal history of other diseases of the digestive system     History of gastroesophageal reflux (GERD)    Personal history of other diseases of the musculoskeletal system and connective tissue 08/29/2013    History of backache    Personal history of other diseases of the nervous system and sense organs     History of sleep apnea    Personal history of other diseases of the nervous system and sense organs     History of deafness    Personal history of other diseases of the respiratory system 02/06/2014    History of acute bronchitis    Personal history of other specified conditions 10/24/2014    History of urinary frequency    Personal history of other specified conditions 01/31/2014    History of weakness    Pure hypercholesterolemia, unspecified 08/29/2013    Low-density-lipoid-type (LDL) hyperlipoproteinemia    Sixth (abducent) nerve palsy, unspecified eye     Sixth nerve palsy       PSH:  Past Surgical History:   Procedure Laterality Date    TOTAL KNEE ARTHROPLASTY  08/29/2013    Knee Replacement       FMH:  Family History   Problem Relation Name Age of Onset    Eczema Mother      Diabetes type II Father         SHx:  Social History     Tobacco Use    Smoking status: Never     Passive exposure: Never    Smokeless tobacco:  Never   Substance Use Topics    Alcohol use: Never       Allergies:  Allergies   Allergen Reactions    Naproxen Other    Niacin Other     Assesment/Plan  We discussed outlet procedures, per pt request. We also discussed procedural interventions for his oab symptoms including frequency, land urgency. I advised him that he could try medications or that I could inject Botox at the time of the procedure to help calm his bladder.       We discussed surgical options for his prostate and bothersome LUTS. We discussed various options including TURP, greenlight, Rezum, Urolift, HoLEP. We discussed HoLEP as a size-independent procedure that maximally de-obstructs the prostate with relatively less postop healing and recovery as compared to other modalities. We discussed the surgery in detail. Discussed the risks of bleeding, infection, scarring, transient incontinence, rare (<1%) risk of long-term incontinence. Discussed the perioperative pathway. Discussed the near certainty of permanent ejaculatory dysfunction, but likely no change to his baseline erectile function.      We discussed management of his irritative voiding symptoms. Discussed that typically the bladder becomes hyperactive in the setting of long-term obstruction, and that resolution of the obstruction typically allows the bladder to calm down over time. This may not be immediate and can take several months. To mitigate this recovery process, we discussed the use of intradetrusor botox to facilitate better bladder storage. Discussed the mechanism of action, risks of infection, transient incomplete emptying (though likely rare in setting of concurrent outlet procedure), inefficacy, need for additional treatment down the line. Will administer 100U at time of surgery      I advised him that even with Botox, he may still have frequency and urgency for a while while healing from the HoLEP procedure. I also advised him that I cannot promise that I can completely  alleviate his OAB symptoms, but I believe that it is a reasonable next step. We discussed that we could always inject more Botox later or try a medication if needed. I reassured him that typically more time after the surgery will usually help calm symptoms, although sometimes it may require further treatment. I also assured him that I will exhaust every treatment option available to treat his frequency. We discussed that if he does nothing, his symptoms could potentially get worse.     Patient understands all r/b/a and would like to proceed with HoLEP and Botox 100u. We will schedule him for my next available operative date.    Scribe Attestation  By signing my name below, I, Litzy Martinez   attest that this documentation has been prepared under the direction and in the presence of Miller Zavala MD.

## 2023-11-03 NOTE — PROGRESS NOTES
Ref per Dr. MIYA Parnell for evaluation and treatment of arthritis.  C/O bilat shoulder x years / foot pain x 6 months.  Eval with Dr. Hsieh with injections bilat shoulder with some relief.  Admits to not taking IBU, better relief with tylenol.  H/O bilat TKR     HPI - he is here with joint pain.  He has had shoulder pain for yrs and has been told that he needs shoulder replacements.  He gets injections, which sometimes help.  He had PT yrs ago.  He was considering shoulder replacement, but he needs to get prostate issues resolved 1st.  He gets some foot pain and toe /numbness/tingling.  No other numbness/tingling.  He states that his primary told him that he might have gout, but pt has not had a typical gout-type flare.   Tylenol helps.  He also has back pain.  He has not had PT for his back.   No joint swelling.  AM stiffness in shoulders.  No fever.  Occ HA.  Intermittent dry mouth - he feels like he needs to drink a lot of water.  No mouth sores, raynauds, rash, or CP.  Occ SOB.  +GERD but no other GI sx    FH - +arthritis.  No CTD.  No parental hip fx  SH - no smoking.  EtOH 1/wk some weeks.  No drugs.    PE  Gen - NAD  Neck - supple.  Thickening in R ant neck (prev treatment for cystic carcinoma and R parotidectomy)  CV - RRR no r/m/g  Lungs - CTA  Abd - +BS  Extr - 2+ DP, PT, and rad pulses.  No edema  Psych - nl affect  Neuro - nl strength.  Reflexes 1+ symmetric B ankles, 2+ symmetric otherwise  Msk - no synovitis.  Pain with ROM B shoulders and decr ROM    A/P - OA with severe OA shoulders for which he plans on having replacements; foot pain with numbness/tingling - could be radicular.   He declines PT and neuro eval d/t other med issues he is addressing  He does not have sx to suggest gout  Check screening DEXA - no parental hip fx  Follow up PRN or based on results    Addendum - nl DEXA

## 2024-01-17 ENCOUNTER — TELEPHONE (OUTPATIENT)
Dept: PREADMISSION TESTING | Facility: HOSPITAL | Age: 69
End: 2024-01-17
Payer: MEDICARE

## 2024-01-22 ENCOUNTER — TELEMEDICINE CLINICAL SUPPORT (OUTPATIENT)
Dept: PREADMISSION TESTING | Facility: HOSPITAL | Age: 69
End: 2024-01-22
Payer: COMMERCIAL

## 2024-01-22 RX ORDER — DEXTROMETHORPHAN HYDROBROMIDE, GUAIFENESIN 5; 100 MG/5ML; MG/5ML
650 LIQUID ORAL EVERY 8 HOURS PRN
COMMUNITY

## 2024-01-23 ENCOUNTER — LAB (OUTPATIENT)
Dept: LAB | Facility: LAB | Age: 69
End: 2024-01-23
Payer: MEDICARE

## 2024-01-23 ENCOUNTER — PRE-ADMISSION TESTING (OUTPATIENT)
Dept: PREADMISSION TESTING | Facility: HOSPITAL | Age: 69
End: 2024-01-23
Payer: MEDICARE

## 2024-01-23 VITALS
RESPIRATION RATE: 16 BRPM | TEMPERATURE: 97.5 F | HEART RATE: 50 BPM | OXYGEN SATURATION: 100 % | WEIGHT: 187.39 LBS | BODY MASS INDEX: 29.41 KG/M2 | DIASTOLIC BLOOD PRESSURE: 78 MMHG | SYSTOLIC BLOOD PRESSURE: 153 MMHG | HEIGHT: 67 IN

## 2024-01-23 DIAGNOSIS — N39.41 URGE INCONTINENCE OF URINE: ICD-10-CM

## 2024-01-23 DIAGNOSIS — E03.9 HYPOTHYROIDISM, UNSPECIFIED TYPE: ICD-10-CM

## 2024-01-23 DIAGNOSIS — I10 BENIGN ESSENTIAL HYPERTENSION: ICD-10-CM

## 2024-01-23 DIAGNOSIS — Z01.818 PREOP TESTING: Primary | ICD-10-CM

## 2024-01-23 DIAGNOSIS — N40.1 BENIGN PROSTATIC HYPERPLASIA WITH LOWER URINARY TRACT SYMPTOMS, SYMPTOM DETAILS UNSPECIFIED: ICD-10-CM

## 2024-01-23 DIAGNOSIS — E78.5 HYPERLIPIDEMIA, UNSPECIFIED HYPERLIPIDEMIA TYPE: ICD-10-CM

## 2024-01-23 LAB
ALBUMIN SERPL BCP-MCNC: 3.8 G/DL (ref 3.4–5)
ALP SERPL-CCNC: 52 U/L (ref 33–136)
ALT SERPL W P-5'-P-CCNC: 14 U/L (ref 10–52)
ANION GAP SERPL CALC-SCNC: 10 MMOL/L (ref 10–20)
APPEARANCE UR: CLEAR
AST SERPL W P-5'-P-CCNC: 17 U/L (ref 9–39)
BILIRUB SERPL-MCNC: 1 MG/DL (ref 0–1.2)
BILIRUB UR STRIP.AUTO-MCNC: NEGATIVE MG/DL
BUN SERPL-MCNC: 12 MG/DL (ref 6–23)
CALCIUM SERPL-MCNC: 8.9 MG/DL (ref 8.6–10.3)
CHLORIDE SERPL-SCNC: 105 MMOL/L (ref 98–107)
CHOLEST SERPL-MCNC: 196 MG/DL (ref 0–199)
CHOLESTEROL/HDL RATIO: 4
CO2 SERPL-SCNC: 28 MMOL/L (ref 21–32)
COLOR UR: YELLOW
CREAT SERPL-MCNC: 0.9 MG/DL (ref 0.5–1.3)
EGFRCR SERPLBLD CKD-EPI 2021: >90 ML/MIN/1.73M*2
ERYTHROCYTE [DISTWIDTH] IN BLOOD BY AUTOMATED COUNT: 12.1 % (ref 11.5–14.5)
GLUCOSE SERPL-MCNC: 100 MG/DL (ref 74–99)
GLUCOSE UR STRIP.AUTO-MCNC: NEGATIVE MG/DL
HCT VFR BLD AUTO: 35.5 % (ref 41–52)
HDLC SERPL-MCNC: 49.1 MG/DL
HGB BLD-MCNC: 11.9 G/DL (ref 13.5–17.5)
KETONES UR STRIP.AUTO-MCNC: NEGATIVE MG/DL
LDLC SERPL CALC-MCNC: 129 MG/DL
LEUKOCYTE ESTERASE UR QL STRIP.AUTO: NEGATIVE
MCH RBC QN AUTO: 28.6 PG (ref 26–34)
MCHC RBC AUTO-ENTMCNC: 33.5 G/DL (ref 32–36)
MCV RBC AUTO: 85 FL (ref 80–100)
MUCOUS THREADS #/AREA URNS AUTO: NORMAL /LPF
NITRITE UR QL STRIP.AUTO: NEGATIVE
NON HDL CHOLESTEROL: 147 MG/DL (ref 0–149)
NRBC BLD-RTO: 0 /100 WBCS (ref 0–0)
PH UR STRIP.AUTO: 7 [PH]
PLATELET # BLD AUTO: 242 X10*3/UL (ref 150–450)
POTASSIUM SERPL-SCNC: 3.8 MMOL/L (ref 3.5–5.3)
PROT SERPL-MCNC: 6.1 G/DL (ref 6.4–8.2)
PROT UR STRIP.AUTO-MCNC: ABNORMAL MG/DL
RBC # BLD AUTO: 4.16 X10*6/UL (ref 4.5–5.9)
RBC # UR STRIP.AUTO: NEGATIVE /UL
RBC #/AREA URNS AUTO: NORMAL /HPF
SODIUM SERPL-SCNC: 139 MMOL/L (ref 136–145)
SP GR UR STRIP.AUTO: 1.01
TRIGL SERPL-MCNC: 92 MG/DL (ref 0–149)
TSH SERPL-ACNC: 1.65 MIU/L (ref 0.44–3.98)
UROBILINOGEN UR STRIP.AUTO-MCNC: <2 MG/DL
VLDL: 18 MG/DL (ref 0–40)
WBC # BLD AUTO: 3.9 X10*3/UL (ref 4.4–11.3)
WBC #/AREA URNS AUTO: NORMAL /HPF

## 2024-01-23 PROCEDURE — 85027 COMPLETE CBC AUTOMATED: CPT

## 2024-01-23 PROCEDURE — 36415 COLL VENOUS BLD VENIPUNCTURE: CPT

## 2024-01-23 PROCEDURE — 93005 ELECTROCARDIOGRAM TRACING: CPT | Performed by: PHYSICIAN ASSISTANT

## 2024-01-23 PROCEDURE — 84443 ASSAY THYROID STIM HORMONE: CPT

## 2024-01-23 PROCEDURE — 80061 LIPID PANEL: CPT

## 2024-01-23 PROCEDURE — 81001 URINALYSIS AUTO W/SCOPE: CPT

## 2024-01-23 PROCEDURE — 99213 OFFICE O/P EST LOW 20 MIN: CPT | Performed by: PHYSICIAN ASSISTANT

## 2024-01-23 PROCEDURE — 80053 COMPREHEN METABOLIC PANEL: CPT

## 2024-01-23 ASSESSMENT — ENCOUNTER SYMPTOMS
LIGHT-HEADEDNESS: 0
RHINORRHEA: 0
MYALGIAS: 0
PALPITATIONS: 0
BRUISES/BLEEDS EASILY: 0
WOUND: 0
SHORTNESS OF BREATH: 0
DIFFICULTY URINATING: 0
WHEEZING: 0
BLOOD IN STOOL: 0
LIMITED RANGE OF MOTION: 0
DYSPNEA AT REST: 0
DYSPNEA WITH EXERTION: 0
NAUSEA: 0
SINUS CONGESTION: 0
UNEXPECTED WEIGHT CHANGE: 0
ABDOMINAL DISTENTION: 0
DOUBLE VISION: 0
TROUBLE SWALLOWING: 0
EYE PAIN: 0
EYE DISCHARGE: 0
ARTHRALGIAS: 1
EXCESSIVE BLEEDING: 0
VOMITING: 0
COUGH: 0
CONFUSION: 0
FEVER: 0
CONSTIPATION: 0
DIARRHEA: 0
SKIN CHANGES: 0
DYSURIA: 0
VISUAL CHANGE: 0
NECK STIFFNESS: 0
NECK PAIN: 0
NUMBNESS: 0
CHILLS: 0
ABDOMINAL PAIN: 0
WEAKNESS: 0
HEMOPTYSIS: 0

## 2024-01-23 NOTE — PREPROCEDURE INSTRUCTIONS
Medication List            Accurate as of January 23, 2024  7:47 AM. Always use your most recent med list.                acetaminophen 650 mg ER tablet  Commonly known as: Tylenol 8 HOUR  Medication Adjustments for Surgery: Take morning of surgery with sip of water, no other fluids     aspirin 81 mg EC tablet  Notes to patient: Hold 5 days prior to surgery     CENTRUM SILVER ORAL  Medication Adjustments for Surgery: Stop 7 days before surgery     ibuprofen 600 mg tablet  Medication Adjustments for Surgery: Stop 7 days before surgery     loratadine 10 mg tablet  Commonly known as: Claritin  Medication Adjustments for Surgery: Take morning of surgery with sip of water, no other fluids     pantoprazole 40 mg EC tablet  Commonly known as: ProtoNix  Take 1 tablet (40 mg) by mouth once daily. Do not crush, chew, or split.  Medication Adjustments for Surgery: Take morning of surgery with sip of water, no other fluids     tamsulosin 0.4 mg 24 hr capsule  Commonly known as: Flomax  Medication Adjustments for Surgery: Take morning of surgery with sip of water, no other fluids                          CONTACT SURGEON'S OFFICE IF YOU DEVELOP:  * Fever = 100.4 F   * New respiratory symptoms (e.g. cough, shortness of breath, respiratory distress, sore throat)  * Recent loss of taste or smell  *Flu like symptoms such as headache, fatigue or gastrointestinal symptoms  * You develop any open sores, shingles, burning or painful urination   AND/OR:  * You no longer wish to have the surgery.  * Any other personal circumstances change that may lead to the need to cancel or defer this surgery.  *You were admitted to any hospital within one week of your planned procedure.    SMOKING:  *Quitting smoking can make a huge difference to your health and recovery from surgery.    *If you need help with quitting, call 9-779-QUIT-NOW.    THE DAY BEFORE SURGERY:  *Do not eat any food after midnight the night before surgery.   *You are permitted  to drink clear liquids (i.e. water, black coffee, tea, clear broth, apple juice) up to 2 hours before your surgery.    DIABETICS:  If diabetic, nothing by mouth (no solids or liquids) for 8 hours prior to surgery.   Please check fasting blood sugar upon waking up.  If fasting sugar is <80 mg/dl, please drink 100ml/3oz of apple juice no later than 2 hours prior to surgery.      SURGICAL TIME  *You will be contacted between 2 p.m. and 6 p.m. the business day before your surgery with your arrival time.  *If you haven't received a call by 6pm, call [phone].  *Scheduled surgery times may change and you will be notified if this occurs-check your personal voicemail for any updates.    ON THE MORNING OF SURGERY:  *Wear comfortable, loose fitting clothing.   *Do not use moisturizers, creams, lotions or perfume.  *All jewelry and valuables should be left at home.  *Prosthetic devices such as contact lenses, hearing aids, dentures, eyelash extensions, hairpins and body piercing must be removed before surgery.    BRING WITH YOU:  *Photo ID and insurance card  *Current list of medicines and allergies  *Pacemaker/Defibrillator/Heart stent cards  *CPAP machine and mask  *Slings/splints/crutches  *Copy of your complete Advanced Directive/DHPOA-if applicable  *Neurostimulator implant remote    PARKING AND ARRIVAL:  *Check in at the Main Entrance desk and let them know you are here for surgery.  *You will be directed to the 2nd floor surgical waiting area.    AFTER OUTPATIENT SURGERY:  *A responsible adult MUST accompany you at the time of discharge and stay with you for 24 hours after your surgery.  *You may NOT drive yourself home after surgery.  *You may use a taxi or ride sharing service ([employer], [employer]) to return home ONLY if you are accompanied by a friend or family member.  *Instructions for resuming your medications will be provided by your surgeon.

## 2024-01-23 NOTE — H&P (VIEW-ONLY)
[facility]/PAT Evaluation       Name: Duane O Reid (Duane O Reid)  /Age: 1955/68 y.o.         Date of Consult: 24    Referring Provider: Dr. Zavala    Surgery, Date, and Length:  Holmium Laser Transurethral Enucleation Prostate, Cystoscopy with Injection Therapeutic Agent , 24, 120MIN    Duane O Reid is a 68 year-old male who presents to the [facility] for perioperative risk assessment prior to surgery.    Patient presents with a primary diagnosis of BPH with LUTS. Pt states he has been experiencing LUTS for the past 10 years; worse over the past couple of years.  Nocturia x 2-4.  He admits to occasional urgency associated with incontinence which occurs about once or twice per day. He denies dysuria, f/c/n/v or gross hematuria.    This note was created in part upon personal review of patient's medical records.      Patient is scheduled to have Holmium Laser Transurethral Enucleation Prostate, Cystoscopy with Injection Therapeutic Agent      Pt denies any past history of anesthetic complications such as PONV, awareness, prolonged sedation, dental damage, aspiration, cardiac arrest, difficult intubation, difficult I.V. access or unexpected hospital admissions.  NO malignant hyperthermia and or pseudocholinesterase deficiency.  No history of blood transfusions     The patient is not a Zoroastrian and will accept blood and blood products if medically indicated.   Type and screen sent.     Past Medical History:   Diagnosis Date    Anemia 2023    H/H 12/37.1    BPH (benign prostatic hyperplasia)     w/ LUTS    Chronic headaches     Facial paralysis/[place] palsy     resolved    GERD (gastroesophageal reflux disease)     Head and neck cancer (CMS/[facility]) 2018    Adenoid cyst carcinoma, s/p resection x 2, chemo and radiation    Hearing aid worn     bilateral    HL (hearing loss)     Lumbar disc herniation     L5-S1    Osteoarthritis     Other vitreous opacities, unspecified eye     Vitreous floaters     Pure hypercholesterolemia, unspecified 2013    Low-density-lipoid-type (LDL) hyperlipoproteinemia    Sickle cell trait (CMS/HCC)     Sleep apnea     uses CPAP intermittently-doesn't like to use       Past Surgical History:   Procedure Laterality Date    HEMORRHOID SURGERY  1980    KNEE ARTHROPLASTY Right 2018    SALIVARY GLAND SURGERY  2018    resection    SALIVARY GLAND SURGERY  2020    revision    TOTAL KNEE ARTHROPLASTY Left 2011       Patient  has no history on file for sexual activity.    Family History   Problem Relation Name Age of Onset    Eczema Mother          age 98    Diabetes type II Father           age 96    Arthritis Sister      Diabetes Brother      Addiction problem Half-Sister          overdose    No Known Problems Half-Sister       Social History     Socioeconomic History    Marital status:      Spouse name: Not on file    Number of children: Not on file    Years of education: Not on file    Highest education level: Not on file   Occupational History    Not on file   Tobacco Use    Smoking status: Former     Packs/day: 0.50     Years: 8.00     Additional pack years: 0.00     Total pack years: 4.00     Types: Cigarettes     Quit date:      Years since quittin.0     Passive exposure: Never    Smokeless tobacco: Never   Substance and Sexual Activity    Alcohol use: Yes     Comment: RARELY-1-2 drinks a month    Drug use: Not Currently     Types: Marijuana     Comment: quit     Sexual activity: Not on file   Other Topics Concern    Not on file   Social History Narrative    Not on file     Social Determinants of Health     Financial Resource Strain: Not on file   Food Insecurity: Not on file   Transportation Needs: Not on file   Physical Activity: Not on file   Stress: Not on file   Social Connections: Not on file   Intimate Partner Violence: Not on file   Housing Stability: Not on file        Allergies   Allergen Reactions    Naproxen Other     Blood in stool    Niacin  Other     Patient doesn't remember       Prior to Admission medications    Medication Sig Start Date End Date Taking? Authorizing Provider   acetaminophen (Tylenol 8 HOUR) 650 mg ER tablet Take 1 tablet (650 mg) by mouth every 8 hours if needed for mild pain (1 - 3). Do not crush, chew, or split.   Yes Historical Provider, MD   aspirin 81 mg EC tablet Take 1 tablet (81 mg) by mouth once daily. 7/9/20  Yes Historical Provider, MD   folic acid/multivit-min/lutein (CENTRUM SILVER ORAL) Take 1 tablet by mouth once daily.   Yes Historical Provider, MD   loratadine (Claritin) 10 mg tablet Take 1 tablet (10 mg) by mouth once daily as needed.   Yes Historical Provider, MD   pantoprazole (ProtoNix) 40 mg EC tablet Take 1 tablet (40 mg) by mouth once daily. Do not crush, chew, or split. 10/19/23 10/18/24 Yes Carmen Graves MD   tamsulosin (Flomax) 0.4 mg 24 hr capsule TAKE ONE CAPSULE BY MOUTH IN THE MORNING AND ONE AT NIGHT.   Yes Historical Provider, MD   ibuprofen 600 mg tablet Take 1 tablet (600 mg) by mouth 3 times a day as needed for mild pain (1 - 3).    Historical Provider, MD        PAT ROS:   Constitutional:    no fever   no chills   no unexpected weight change  Neuro/Psych:    no numbness   no weakness   no light-headedness   no confusion  Eyes:    no discharge   no pain   no vision loss   no diplopia   no visual disturbance   use of corrective lenses  Ears:    no ear pain   hearing loss   no tinnitus   hearing aides  Nose:    no nasal discharge   no sinus congestion   no epistaxis  Mouth:    no dental issues   no mouth pain   no oral bleeding   no mouth lesions  Throat:    no throat pain   no dysphagia  Neck:    no neck pain   no neck stiffness  Cardio:    Functional 4 Mets. Patient denies SOB walking up 2 flights of stairs   Works for provide a ride, walking (during work),  cleaning, grocery shopping, yard work (mowing, raking, snow blowing)   no chest pain   no palpitations   no peripheral edema   no dyspnea   no  CARPIO  Respiratory:    no cough   no wheezing   no hemoptysis   no shortness of breath  Endocrine:    no cold intolerance   no heat intolerance  GI:    no abdominal distention   no abdominal pain   no constipation   no diarrhea   no nausea   no vomiting   no blood in stool  :    no difficulty urinating   no dysuria   no oliguria   polyuria  Musculoskeletal:    arthralgias (LBP, b/l shoulders)   no myalgias   no decreased ROM  Hematologic:    does not bruise/bleed easily   no excessive bleeding   no history of blood transfusion   no blood clots  Skin:   no skin changes   no sores/wound   no rash      Physical Exam  Constitutional:       General: He is not in acute distress.     Appearance: Normal appearance. He is not ill-appearing, toxic-appearing or diaphoretic.   HENT:      Head: Normocephalic and atraumatic.      Nose: Nose normal. No rhinorrhea.      Mouth/Throat:      Pharynx: No oropharyngeal exudate.   Eyes:      Extraocular Movements: Extraocular movements intact.      Conjunctiva/sclera: Conjunctivae normal.   Cardiovascular:      Rate and Rhythm: Normal rate and regular rhythm.      Heart sounds: No murmur heard.     No friction rub. No gallop.      Comments: Functional 4 Mets. Patient denies SOB walking up 2 flights of stairs     Pulmonary:      Effort: Pulmonary effort is normal. No respiratory distress.      Breath sounds: Normal breath sounds. No stridor. No wheezing or rhonchi.   Abdominal:      General: Bowel sounds are normal. There is no distension.      Palpations: Abdomen is soft. There is no mass.      Tenderness: There is no abdominal tenderness. There is no guarding or rebound.      Hernia: No hernia is present.   Musculoskeletal:         General: No swelling, tenderness, deformity or signs of injury. Normal range of motion.      Cervical back: Normal range of motion and neck supple. No rigidity or tenderness.   Skin:     General: Skin is warm and dry.      Coloration: Skin is not jaundiced or  "pale.      Findings: No bruising, erythema, lesion or rash.   Neurological:      General: No focal deficit present.      Mental Status: He is alert and oriented to person, place, and time.      Cranial Nerves: No cranial nerve deficit.      Sensory: No sensory deficit.      Motor: No weakness.      Coordination: Coordination normal.   Psychiatric:         Mood and Affect: Mood normal.         Behavior: Behavior normal.          PAT AIRWAY:   Airway:     Mallampati::  II    Neck ROM::  Full   Multiple missing teeth      Visit Vitals  /78   Pulse 50   Temp 36.4 °C (97.5 °F)   Resp 16   Ht 1.7 m (5' 6.93\")   Wt 85 kg (187 lb 6.3 oz)   SpO2 100%   BMI 29.41 kg/m²   Smoking Status Former   BSA 2 m²          DASI Risk Score    No data to display       Caprini DVT Assessment    No data to display       Modified Frailty Index    No data to display       CHADS2 Stroke Risk  Current as of 3 hours ago        N/A 3 - 100%: High Risk   2 - 3%: Medium Risk   0 - 2%: Low Risk     Last Change: N/A          This score determines the patient's risk of having a stroke if the patient has atrial fibrillation.        This score is not applicable to this patient. Components are not calculated.          Revised Cardiac Risk Index    No data to display       Apfel Simplified Score    No data to display       Risk Analysis Index Results This Encounter    No data found in the last 1 encounters.       Lab Results   Component Value Date    WBC 3.9 (L) 01/23/2024    HGB 11.9 (L) 01/23/2024    HCT 35.5 (L) 01/23/2024    MCV 85 01/23/2024     01/23/2024     Lab Results   Component Value Date    GLUCOSE 100 (H) 01/23/2024    CALCIUM 8.9 01/23/2024     01/23/2024    K 3.8 01/23/2024    CO2 28 01/23/2024     01/23/2024    BUN 12 01/23/2024    CREATININE 0.90 01/23/2024     Urinalysis with Reflex Culture and Microscopic  Order: [id number] - Part of Panel Order [id number]  Status: Final result       Visible to patient: No " (inaccessible in [facility])       Dx: Urge incontinence of urine; Benign pr...    0 Result Notes            Component  Ref Range & Units 08:10  (1/23/24) 4 mo ago  (9/1/23) 1 yr ago  (1/17/23) 1 yr ago  (5/2/22) 3 yr ago  (1/23/21) 4 yr ago  (1/11/20) 6 yr ago  (1/23/18)   Color, Urine  Straw, Yellow Yellow YELLOW R YELLOW R YELLOW R YELLOW R YELLOW R STRAW R   Appearance, Urine  Clear Clear CLEAR R CLEAR R CLEAR R CLEAR R CLEAR R CLEAR R   Specific Gravity, Urine  1.005 - 1.035 1.012 1.012 1.020 1.010 1.011 1.012 1.005 R   pH, Urine  5.0, 5.5, 6.0, 6.5, 7.0, 7.5, 8.0 7.0 6.0 R 6.0 R 6.0 R 7.0 R 6.0 R 7.0 R   Protein, Urine  NEGATIVE mg/dL 30 (1+) Normal (N) 30 (1+) Abnormal  NEGATIVE NEGATIVE NEGATIVE NEGATIVE NEGATIVE   Glucose, Urine  NEGATIVE mg/dL NEGATIVE NEGATIVE NEGATIVE NEGATIVE NEGATIVE NEGATIVE NEGATIVE   Blood, Urine  NEGATIVE NEGATIVE NEGATIVE NEGATIVE NEGATIVE NEGATIVE NEGATIVE NEGATIVE   Ketones, Urine  NEGATIVE mg/dL NEGATIVE NEGATIVE NEGATIVE NEGATIVE NEGATIVE NEGATIVE NEGATIVE   Bilirubin, Urine  NEGATIVE NEGATIVE NEGATIVE NEGATIVE NEGATIVE NEGATIVE NEGATIVE NEGATIVE   Urobilinogen, Urine  <2.0 mg/dL <2.0 <2.0 R <2.0 R <2.0 R <2.0 R <2.0 R 0.2 R   Nitrite, Urine  NEGATIVE NEGATIVE NEGATIVE NEGATIVE NEGATIVE NEGATIVE NEGATIVE NEGATIVE   Leukocyte Esterase, Urine  NEGATIVE NEGATIVE NEGATIVE NEGATIVE NEGATIVE NEGATIVE NEGATIVE NEGATIVE   Resulting Agency [facility]              Specimen Collected: 01/23/24 08:10 Last Resulted: 01/23/24 08:49           Urinalysis Microscopic  Order: [id number] - Reflex for Order [id number]  Status: Final result       Visible to patient: No (inaccessible in [facility])       Dx: Urge incontinence of urine; Benign pr...    0 Result Notes       Component  Ref Range & Units 08:10 4 mo ago   WBC, Urine  1-5, NONE /HPF NONE <1 R   RBC, Urine  NONE, 1-2, 3-5 /HPF NONE <1 R   Mucus, Urine  Reference range not established. /LPF 1+ 1+  R   Resulting Agency [facility]              Specimen Collected: 01/23/24 08:10 Last Resulted: 01/23/24 08:49               EKG 1/23/24  Sinus bradycardia  Minimal voltage criteria for LVH, may be normal variant  Borderline EKG  Vent rate = 52 bpm    Assessment and Plan:     Patient is a 68-year-old male scheduled for a Holmium Laser Transurethral Enucleation Prostate, Cystoscopy with Injection Therapeutic Agent with Dr. Zavala on 1/30/24.    Patient has no active cardiac symptoms.   Patient denies any chest pain, tightness, heaviness, pressure, radiating pain, palpitations, irregular heartbeats, lightheadedness, cough, congestion, shortness of breath, CARPIO, PND, near syncope, weight loss or gain.     RCRI  1 (type of surgery)  , 6 % Risk of MACE    Cardiac:  HLD    Pulmonary:  KEY - Known and treated  KEY- Patient was instructed to bring CPAP machine the morning of surgery. Recommend prioritizing  nonopioid analgesic techniques (regional and local anesthesia, nonsteroidal medications, etc) before the administration of opioids and close monitoring for hypoventilation after surgery due to KEY. Increased vigilance is recommended with the use of narcotics due to an increased risk for opioid induced respiratory depression       Hematology:  Anemia - sickle cell trait - not on any iron supplement  1/23/24 H/H 11.9/35.5%    Patient instructed to ambulate as soon as possible postoperatively to decrease thromboembolic risk.   Initiate mechanical DVT prophylaxis as soon as possible and initiate chemical prophylaxis when deemed safe from a bleeding standpoint post surgery.     LABS: CBC, BMP, UA flex and EKG ordered. Lab results reviewed and show mild anemia.      STOP BANG: +KEY    Caprini: 7    Risk assessment complete.  Patient is scheduled for a intermediate surgical risk procedure.        Preoperative medication instructions were provided and reviewed with the patient.  Any additional testing or evaluation was explained to  the patient.  Nothing by mouth instructions were discussed and patient's questions were answered prior to conclusion to this encounter.  Patient verbalized understanding of preoperative instructions given in preadmission testing; discharge instructions available in EMR.    This note was dictated by a speech recognition.  Minor errors may have been detected in a speech recognition.

## 2024-01-23 NOTE — CPM/PAT H&P
[facility]/PAT Evaluation       Name: Duane O Reid (Duane O Reid)  /Age: 1955/68 y.o.         Date of Consult: 24    Referring Provider: Dr. Zavala    Surgery, Date, and Length:  Holmium Laser Transurethral Enucleation Prostate, Cystoscopy with Injection Therapeutic Agent , 24, 120MIN    Duane O Reid is a 68 year-old male who presents to the [facility] for perioperative risk assessment prior to surgery.    Patient presents with a primary diagnosis of BPH with LUTS. Pt states he has been experiencing LUTS for the past 10 years; worse over the past couple of years.  Nocturia x 2-4.  He admits to occasional urgency associated with incontinence which occurs about once or twice per day. He denies dysuria, f/c/n/v or gross hematuria.    This note was created in part upon personal review of patient's medical records.      Patient is scheduled to have Holmium Laser Transurethral Enucleation Prostate, Cystoscopy with Injection Therapeutic Agent      Pt denies any past history of anesthetic complications such as PONV, awareness, prolonged sedation, dental damage, aspiration, cardiac arrest, difficult intubation, difficult I.V. access or unexpected hospital admissions.  NO malignant hyperthermia and or pseudocholinesterase deficiency.  No history of blood transfusions     The patient is not a [employer] and will accept blood and blood products if medically indicated.   Type and screen sent.     Past Medical History:   Diagnosis Date    Anemia 2023    H/H 12/37.1    BPH (benign prostatic hyperplasia)     w/ LUTS    Chronic headaches     Facial paralysis/[place] palsy     resolved    GERD (gastroesophageal reflux disease)     Head and neck cancer (CMS/[facility]) 2018    Adenoid cyst carcinoma, s/p resection x 2, chemo and radiation    Hearing aid worn     bilateral    HL (hearing loss)     Lumbar disc herniation     L5-S1    Osteoarthritis     Other vitreous opacities, unspecified eye     Vitreous floaters     Pure hypercholesterolemia, unspecified 2013    Low-density-lipoid-type (LDL) hyperlipoproteinemia    Sickle cell trait (CMS/HCC)     Sleep apnea     uses CPAP intermittently-doesn't like to use       Past Surgical History:   Procedure Laterality Date    HEMORRHOID SURGERY  1980    KNEE ARTHROPLASTY Right 2018    SALIVARY GLAND SURGERY  2018    resection    SALIVARY GLAND SURGERY  2020    revision    TOTAL KNEE ARTHROPLASTY Left 2011       Patient  has no history on file for sexual activity.    Family History   Problem Relation Name Age of Onset    Eczema Mother          age 98    Diabetes type II Father           age 96    Arthritis Sister      Diabetes Brother      Addiction problem Half-Sister          overdose    No Known Problems Half-Sister       Social History     Socioeconomic History    Marital status:      Spouse name: Not on file    Number of children: Not on file    Years of education: Not on file    Highest education level: Not on file   Occupational History    Not on file   Tobacco Use    Smoking status: Former     Packs/day: 0.50     Years: 8.00     Additional pack years: 0.00     Total pack years: 4.00     Types: Cigarettes     Quit date:      Years since quittin.0     Passive exposure: Never    Smokeless tobacco: Never   Substance and Sexual Activity    Alcohol use: Yes     Comment: RARELY-1-2 drinks a month    Drug use: Not Currently     Types: Marijuana     Comment: quit     Sexual activity: Not on file   Other Topics Concern    Not on file   Social History Narrative    Not on file     Social Determinants of Health     Financial Resource Strain: Not on file   Food Insecurity: Not on file   Transportation Needs: Not on file   Physical Activity: Not on file   Stress: Not on file   Social Connections: Not on file   Intimate Partner Violence: Not on file   Housing Stability: Not on file        Allergies   Allergen Reactions    Naproxen Other     Blood in stool    Niacin  Other     Patient doesn't remember       Prior to Admission medications    Medication Sig Start Date End Date Taking? Authorizing Provider   acetaminophen (Tylenol 8 HOUR) 650 mg ER tablet Take 1 tablet (650 mg) by mouth every 8 hours if needed for mild pain (1 - 3). Do not crush, chew, or split.   Yes Historical Provider, MD   aspirin 81 mg EC tablet Take 1 tablet (81 mg) by mouth once daily. 7/9/20  Yes Historical Provider, MD   folic acid/multivit-min/lutein (CENTRUM SILVER ORAL) Take 1 tablet by mouth once daily.   Yes Historical Provider, MD   loratadine (Claritin) 10 mg tablet Take 1 tablet (10 mg) by mouth once daily as needed.   Yes Historical Provider, MD   pantoprazole (ProtoNix) 40 mg EC tablet Take 1 tablet (40 mg) by mouth once daily. Do not crush, chew, or split. 10/19/23 10/18/24 Yes Carmen Graves MD   tamsulosin (Flomax) 0.4 mg 24 hr capsule TAKE ONE CAPSULE BY MOUTH IN THE MORNING AND ONE AT NIGHT.   Yes Historical Provider, MD   ibuprofen 600 mg tablet Take 1 tablet (600 mg) by mouth 3 times a day as needed for mild pain (1 - 3).    Historical Provider, MD        PAT ROS:   Constitutional:    no fever   no chills   no unexpected weight change  Neuro/Psych:    no numbness   no weakness   no light-headedness   no confusion  Eyes:    no discharge   no pain   no vision loss   no diplopia   no visual disturbance   use of corrective lenses  Ears:    no ear pain   hearing loss   no tinnitus   hearing aides  Nose:    no nasal discharge   no sinus congestion   no epistaxis  Mouth:    no dental issues   no mouth pain   no oral bleeding   no mouth lesions  Throat:    no throat pain   no dysphagia  Neck:    no neck pain   no neck stiffness  Cardio:    Functional 4 Mets. Patient denies SOB walking up 2 flights of stairs   Works for provide a ride, walking (during work),  cleaning, grocery shopping, yard work (mowing, raking, snow blowing)   no chest pain   no palpitations   no peripheral edema   no dyspnea   no  CARPIO  Respiratory:    no cough   no wheezing   no hemoptysis   no shortness of breath  Endocrine:    no cold intolerance   no heat intolerance  GI:    no abdominal distention   no abdominal pain   no constipation   no diarrhea   no nausea   no vomiting   no blood in stool  :    no difficulty urinating   no dysuria   no oliguria   polyuria  Musculoskeletal:    arthralgias (LBP, b/l shoulders)   no myalgias   no decreased ROM  Hematologic:    does not bruise/bleed easily   no excessive bleeding   no history of blood transfusion   no blood clots  Skin:   no skin changes   no sores/wound   no rash      Physical Exam  Constitutional:       General: He is not in acute distress.     Appearance: Normal appearance. He is not ill-appearing, toxic-appearing or diaphoretic.   HENT:      Head: Normocephalic and atraumatic.      Nose: Nose normal. No rhinorrhea.      Mouth/Throat:      Pharynx: No oropharyngeal exudate.   Eyes:      Extraocular Movements: Extraocular movements intact.      Conjunctiva/sclera: Conjunctivae normal.   Cardiovascular:      Rate and Rhythm: Normal rate and regular rhythm.      Heart sounds: No murmur heard.     No friction rub. No gallop.      Comments: Functional 4 Mets. Patient denies SOB walking up 2 flights of stairs     Pulmonary:      Effort: Pulmonary effort is normal. No respiratory distress.      Breath sounds: Normal breath sounds. No stridor. No wheezing or rhonchi.   Abdominal:      General: Bowel sounds are normal. There is no distension.      Palpations: Abdomen is soft. There is no mass.      Tenderness: There is no abdominal tenderness. There is no guarding or rebound.      Hernia: No hernia is present.   Musculoskeletal:         General: No swelling, tenderness, deformity or signs of injury. Normal range of motion.      Cervical back: Normal range of motion and neck supple. No rigidity or tenderness.   Skin:     General: Skin is warm and dry.      Coloration: Skin is not jaundiced or  "pale.      Findings: No bruising, erythema, lesion or rash.   Neurological:      General: No focal deficit present.      Mental Status: He is alert and oriented to person, place, and time.      Cranial Nerves: No cranial nerve deficit.      Sensory: No sensory deficit.      Motor: No weakness.      Coordination: Coordination normal.   Psychiatric:         Mood and Affect: Mood normal.         Behavior: Behavior normal.          PAT AIRWAY:   Airway:     Mallampati::  II    Neck ROM::  Full   Multiple missing teeth      Visit Vitals  /78   Pulse 50   Temp 36.4 °C (97.5 °F)   Resp 16   Ht 1.7 m (5' 6.93\")   Wt 85 kg (187 lb 6.3 oz)   SpO2 100%   BMI 29.41 kg/m²   Smoking Status Former   BSA 2 m²          DASI Risk Score    No data to display       Caprini DVT Assessment    No data to display       Modified Frailty Index    No data to display       CHADS2 Stroke Risk  Current as of 3 hours ago        N/A 3 - 100%: High Risk   2 - 3%: Medium Risk   0 - 2%: Low Risk     Last Change: N/A          This score determines the patient's risk of having a stroke if the patient has atrial fibrillation.        This score is not applicable to this patient. Components are not calculated.          Revised Cardiac Risk Index    No data to display       Apfel Simplified Score    No data to display       Risk Analysis Index Results This Encounter    No data found in the last 1 encounters.       Lab Results   Component Value Date    WBC 3.9 (L) 01/23/2024    HGB 11.9 (L) 01/23/2024    HCT 35.5 (L) 01/23/2024    MCV 85 01/23/2024     01/23/2024     Lab Results   Component Value Date    GLUCOSE 100 (H) 01/23/2024    CALCIUM 8.9 01/23/2024     01/23/2024    K 3.8 01/23/2024    CO2 28 01/23/2024     01/23/2024    BUN 12 01/23/2024    CREATININE 0.90 01/23/2024     Urinalysis with Reflex Culture and Microscopic  Order: [id number] - Part of Panel Order [id number]  Status: Final result       Visible to patient: No " (inaccessible in [facility])       Dx: Urge incontinence of urine; Benign pr...    0 Result Notes            Component  Ref Range & Units 08:10  (1/23/24) 4 mo ago  (9/1/23) 1 yr ago  (1/17/23) 1 yr ago  (5/2/22) 3 yr ago  (1/23/21) 4 yr ago  (1/11/20) 6 yr ago  (1/23/18)   Color, Urine  Straw, Yellow Yellow YELLOW R YELLOW R YELLOW R YELLOW R YELLOW R STRAW R   Appearance, Urine  Clear Clear CLEAR R CLEAR R CLEAR R CLEAR R CLEAR R CLEAR R   Specific Gravity, Urine  1.005 - 1.035 1.012 1.012 1.020 1.010 1.011 1.012 1.005 R   pH, Urine  5.0, 5.5, 6.0, 6.5, 7.0, 7.5, 8.0 7.0 6.0 R 6.0 R 6.0 R 7.0 R 6.0 R 7.0 R   Protein, Urine  NEGATIVE mg/dL 30 (1+) Normal (N) 30 (1+) Abnormal  NEGATIVE NEGATIVE NEGATIVE NEGATIVE NEGATIVE   Glucose, Urine  NEGATIVE mg/dL NEGATIVE NEGATIVE NEGATIVE NEGATIVE NEGATIVE NEGATIVE NEGATIVE   Blood, Urine  NEGATIVE NEGATIVE NEGATIVE NEGATIVE NEGATIVE NEGATIVE NEGATIVE NEGATIVE   Ketones, Urine  NEGATIVE mg/dL NEGATIVE NEGATIVE NEGATIVE NEGATIVE NEGATIVE NEGATIVE NEGATIVE   Bilirubin, Urine  NEGATIVE NEGATIVE NEGATIVE NEGATIVE NEGATIVE NEGATIVE NEGATIVE NEGATIVE   Urobilinogen, Urine  <2.0 mg/dL <2.0 <2.0 R <2.0 R <2.0 R <2.0 R <2.0 R 0.2 R   Nitrite, Urine  NEGATIVE NEGATIVE NEGATIVE NEGATIVE NEGATIVE NEGATIVE NEGATIVE NEGATIVE   Leukocyte Esterase, Urine  NEGATIVE NEGATIVE NEGATIVE NEGATIVE NEGATIVE NEGATIVE NEGATIVE NEGATIVE   Resulting Agency [facility]              Specimen Collected: 01/23/24 08:10 Last Resulted: 01/23/24 08:49           Urinalysis Microscopic  Order: [id number] - Reflex for Order [id number]  Status: Final result       Visible to patient: No (inaccessible in [facility])       Dx: Urge incontinence of urine; Benign pr...    0 Result Notes       Component  Ref Range & Units 08:10 4 mo ago   WBC, Urine  1-5, NONE /HPF NONE <1 R   RBC, Urine  NONE, 1-2, 3-5 /HPF NONE <1 R   Mucus, Urine  Reference range not established. /LPF 1+ 1+  R   Resulting Agency [facility]              Specimen Collected: 01/23/24 08:10 Last Resulted: 01/23/24 08:49               EKG 1/23/24  Sinus bradycardia  Minimal voltage criteria for LVH, may be normal variant  Borderline EKG  Vent rate = 52 bpm    Assessment and Plan:     Patient is a 68-year-old male scheduled for a Holmium Laser Transurethral Enucleation Prostate, Cystoscopy with Injection Therapeutic Agent with Dr. Zavala on 1/30/24.    Patient has no active cardiac symptoms.   Patient denies any chest pain, tightness, heaviness, pressure, radiating pain, palpitations, irregular heartbeats, lightheadedness, cough, congestion, shortness of breath, CARPIO, PND, near syncope, weight loss or gain.     RCRI  1 (type of surgery)  , 6 % Risk of MACE    Cardiac:  HLD    Pulmonary:  KEY - Known and treated  KEY- Patient was instructed to bring CPAP machine the morning of surgery. Recommend prioritizing  nonopioid analgesic techniques (regional and local anesthesia, nonsteroidal medications, etc) before the administration of opioids and close monitoring for hypoventilation after surgery due to KEY. Increased vigilance is recommended with the use of narcotics due to an increased risk for opioid induced respiratory depression       Hematology:  Anemia - sickle cell trait - not on any iron supplement  1/23/24 H/H 11.9/35.5%    Patient instructed to ambulate as soon as possible postoperatively to decrease thromboembolic risk.   Initiate mechanical DVT prophylaxis as soon as possible and initiate chemical prophylaxis when deemed safe from a bleeding standpoint post surgery.     LABS: CBC, BMP, UA flex and EKG ordered. Lab results reviewed and show mild anemia.      STOP BANG: +KEY    Caprini: 7    Risk assessment complete.  Patient is scheduled for a intermediate surgical risk procedure.        Preoperative medication instructions were provided and reviewed with the patient.  Any additional testing or evaluation was explained to  the patient.  Nothing by mouth instructions were discussed and patient's questions were answered prior to conclusion to this encounter.  Patient verbalized understanding of preoperative instructions given in preadmission testing; discharge instructions available in EMR.    This note was dictated by a speech recognition.  Minor errors may have been detected in a speech recognition.

## 2024-01-24 LAB
ATRIAL RATE: 52 BPM
P AXIS: 16 DEGREES
P OFFSET: 180 MS
P ONSET: 122 MS
PR INTERVAL: 190 MS
Q ONSET: 217 MS
QRS COUNT: 9 BEATS
QRS DURATION: 98 MS
QT INTERVAL: 432 MS
QTC CALCULATION(BAZETT): 401 MS
QTC FREDERICIA: 411 MS
R AXIS: 33 DEGREES
T AXIS: 55 DEGREES
T OFFSET: 433 MS
VENTRICULAR RATE: 52 BPM

## 2024-01-30 ENCOUNTER — HOSPITAL ENCOUNTER (OUTPATIENT)
Facility: HOSPITAL | Age: 69
Setting detail: OUTPATIENT SURGERY
Discharge: HOME | End: 2024-01-30
Attending: UROLOGY | Admitting: UROLOGY
Payer: MEDICARE

## 2024-01-30 ENCOUNTER — ANESTHESIA (OUTPATIENT)
Dept: OPERATING ROOM | Facility: HOSPITAL | Age: 69
End: 2024-01-30
Payer: MEDICARE

## 2024-01-30 ENCOUNTER — ANESTHESIA EVENT (OUTPATIENT)
Dept: OPERATING ROOM | Facility: HOSPITAL | Age: 69
End: 2024-01-30
Payer: MEDICARE

## 2024-01-30 VITALS
OXYGEN SATURATION: 97 % | RESPIRATION RATE: 16 BRPM | BODY MASS INDEX: 30.33 KG/M2 | DIASTOLIC BLOOD PRESSURE: 77 MMHG | HEART RATE: 56 BPM | TEMPERATURE: 97.3 F | SYSTOLIC BLOOD PRESSURE: 146 MMHG | WEIGHT: 188.71 LBS | HEIGHT: 66 IN

## 2024-01-30 DIAGNOSIS — R39.9 LOWER URINARY TRACT SYMPTOMS (LUTS): ICD-10-CM

## 2024-01-30 DIAGNOSIS — N40.1 BENIGN PROSTATIC HYPERPLASIA WITH LOWER URINARY TRACT SYMPTOMS, SYMPTOM DETAILS UNSPECIFIED: Primary | ICD-10-CM

## 2024-01-30 DIAGNOSIS — N39.41 URGE INCONTINENCE OF URINE: ICD-10-CM

## 2024-01-30 PROCEDURE — 3600000009 HC OR TIME - EACH INCREMENTAL 1 MINUTE - PROCEDURE LEVEL FOUR: Performed by: UROLOGY

## 2024-01-30 PROCEDURE — 3700000002 HC GENERAL ANESTHESIA TIME - EACH INCREMENTAL 1 MINUTE: Performed by: UROLOGY

## 2024-01-30 PROCEDURE — 2720000007 HC OR 272 NO HCPCS: Performed by: UROLOGY

## 2024-01-30 PROCEDURE — 7100000001 HC RECOVERY ROOM TIME - INITIAL BASE CHARGE: Performed by: UROLOGY

## 2024-01-30 PROCEDURE — C1758 CATHETER, URETERAL: HCPCS | Performed by: UROLOGY

## 2024-01-30 PROCEDURE — 3600000004 HC OR TIME - INITIAL BASE CHARGE - PROCEDURE LEVEL FOUR: Performed by: UROLOGY

## 2024-01-30 PROCEDURE — A4217 STERILE WATER/SALINE, 500 ML: HCPCS | Performed by: UROLOGY

## 2024-01-30 PROCEDURE — 52649 PROSTATE LASER ENUCLEATION: CPT | Performed by: UROLOGY

## 2024-01-30 PROCEDURE — A52649 PR LASER ENUCLEATION PROSTATE W MORCELLATION: Performed by: NURSE ANESTHETIST, CERTIFIED REGISTERED

## 2024-01-30 PROCEDURE — 7100000002 HC RECOVERY ROOM TIME - EACH INCREMENTAL 1 MINUTE: Performed by: UROLOGY

## 2024-01-30 PROCEDURE — 2500000004 HC RX 250 GENERAL PHARMACY W/ HCPCS (ALT 636 FOR OP/ED): Performed by: UROLOGY

## 2024-01-30 PROCEDURE — 2500000004 HC RX 250 GENERAL PHARMACY W/ HCPCS (ALT 636 FOR OP/ED): Performed by: NURSE ANESTHETIST, CERTIFIED REGISTERED

## 2024-01-30 PROCEDURE — 52287 CYSTOSCOPY CHEMODENERVATION: CPT | Performed by: UROLOGY

## 2024-01-30 PROCEDURE — A52649 PR LASER ENUCLEATION PROSTATE W MORCELLATION: Performed by: ANESTHESIOLOGY

## 2024-01-30 PROCEDURE — 7100000009 HC PHASE TWO TIME - INITIAL BASE CHARGE: Performed by: UROLOGY

## 2024-01-30 PROCEDURE — 88307 TISSUE EXAM BY PATHOLOGIST: CPT | Mod: TC,SUR,AHULAB | Performed by: UROLOGY

## 2024-01-30 PROCEDURE — 51700 IRRIGATION OF BLADDER: CPT

## 2024-01-30 PROCEDURE — 51702 INSERT TEMP BLADDER CATH: CPT

## 2024-01-30 PROCEDURE — 88307 TISSUE EXAM BY PATHOLOGIST: CPT | Performed by: PATHOLOGY

## 2024-01-30 PROCEDURE — 7100000010 HC PHASE TWO TIME - EACH INCREMENTAL 1 MINUTE: Performed by: UROLOGY

## 2024-01-30 PROCEDURE — 3700000001 HC GENERAL ANESTHESIA TIME - INITIAL BASE CHARGE: Performed by: UROLOGY

## 2024-01-30 PROCEDURE — 2500000005 HC RX 250 GENERAL PHARMACY W/O HCPCS: Performed by: NURSE ANESTHETIST, CERTIFIED REGISTERED

## 2024-01-30 RX ORDER — ONDANSETRON HYDROCHLORIDE 2 MG/ML
4 INJECTION, SOLUTION INTRAVENOUS ONCE AS NEEDED
Status: DISCONTINUED | OUTPATIENT
Start: 2024-01-30 | End: 2024-01-30 | Stop reason: HOSPADM

## 2024-01-30 RX ORDER — LABETALOL HYDROCHLORIDE 5 MG/ML
5 INJECTION, SOLUTION INTRAVENOUS ONCE AS NEEDED
Status: DISCONTINUED | OUTPATIENT
Start: 2024-01-30 | End: 2024-01-30 | Stop reason: HOSPADM

## 2024-01-30 RX ORDER — ACETAMINOPHEN 325 MG/1
650 TABLET ORAL ONCE
Status: DISCONTINUED | OUTPATIENT
Start: 2024-01-30 | End: 2024-01-30 | Stop reason: HOSPADM

## 2024-01-30 RX ORDER — MEPERIDINE HYDROCHLORIDE 25 MG/ML
12.5 INJECTION INTRAMUSCULAR; INTRAVENOUS; SUBCUTANEOUS EVERY 10 MIN PRN
Status: DISCONTINUED | OUTPATIENT
Start: 2024-01-30 | End: 2024-01-30 | Stop reason: HOSPADM

## 2024-01-30 RX ORDER — MIDAZOLAM HYDROCHLORIDE 1 MG/ML
1 INJECTION INTRAMUSCULAR; INTRAVENOUS ONCE AS NEEDED
Status: DISCONTINUED | OUTPATIENT
Start: 2024-01-30 | End: 2024-01-30 | Stop reason: HOSPADM

## 2024-01-30 RX ORDER — KETOROLAC TROMETHAMINE 30 MG/ML
INJECTION, SOLUTION INTRAMUSCULAR; INTRAVENOUS AS NEEDED
Status: DISCONTINUED | OUTPATIENT
Start: 2024-01-30 | End: 2024-01-30

## 2024-01-30 RX ORDER — PHENAZOPYRIDINE HYDROCHLORIDE 100 MG/1
100 TABLET, FILM COATED ORAL 3 TIMES DAILY PRN
Qty: 15 TABLET | Refills: 0 | Status: SHIPPED | OUTPATIENT
Start: 2024-01-30 | End: 2024-02-21 | Stop reason: WASHOUT

## 2024-01-30 RX ORDER — DEXAMETHASONE SODIUM PHOSPHATE 100 MG/10ML
INJECTION INTRAMUSCULAR; INTRAVENOUS AS NEEDED
Status: DISCONTINUED | OUTPATIENT
Start: 2024-01-30 | End: 2024-01-30

## 2024-01-30 RX ORDER — SODIUM CHLORIDE, SODIUM LACTATE, POTASSIUM CHLORIDE, CALCIUM CHLORIDE 600; 310; 30; 20 MG/100ML; MG/100ML; MG/100ML; MG/100ML
INJECTION, SOLUTION INTRAVENOUS CONTINUOUS PRN
Status: DISCONTINUED | OUTPATIENT
Start: 2024-01-30 | End: 2024-01-30

## 2024-01-30 RX ORDER — ONDANSETRON HYDROCHLORIDE 2 MG/ML
INJECTION, SOLUTION INTRAVENOUS AS NEEDED
Status: DISCONTINUED | OUTPATIENT
Start: 2024-01-30 | End: 2024-01-30

## 2024-01-30 RX ORDER — LIDOCAINE HYDROCHLORIDE 10 MG/ML
0.1 INJECTION, SOLUTION EPIDURAL; INFILTRATION; INTRACAUDAL; PERINEURAL ONCE
Status: DISCONTINUED | OUTPATIENT
Start: 2024-01-30 | End: 2024-01-30 | Stop reason: HOSPADM

## 2024-01-30 RX ORDER — ALBUTEROL SULFATE 0.83 MG/ML
2.5 SOLUTION RESPIRATORY (INHALATION) ONCE AS NEEDED
Status: DISCONTINUED | OUTPATIENT
Start: 2024-01-30 | End: 2024-01-30 | Stop reason: HOSPADM

## 2024-01-30 RX ORDER — OXYCODONE HYDROCHLORIDE 5 MG/1
5 TABLET ORAL EVERY 6 HOURS PRN
Qty: 4 TABLET | Refills: 0 | Status: SHIPPED | OUTPATIENT
Start: 2024-01-30 | End: 2024-02-02

## 2024-01-30 RX ORDER — FENTANYL CITRATE 50 UG/ML
INJECTION, SOLUTION INTRAMUSCULAR; INTRAVENOUS AS NEEDED
Status: DISCONTINUED | OUTPATIENT
Start: 2024-01-30 | End: 2024-01-30

## 2024-01-30 RX ORDER — SULFAMETHOXAZOLE AND TRIMETHOPRIM 800; 160 MG/1; MG/1
1 TABLET ORAL 2 TIMES DAILY
Qty: 6 TABLET | Refills: 0 | Status: SHIPPED | OUTPATIENT
Start: 2024-01-30 | End: 2024-02-02

## 2024-01-30 RX ORDER — OXYCODONE HYDROCHLORIDE 5 MG/1
5 TABLET ORAL EVERY 4 HOURS PRN
Status: DISCONTINUED | OUTPATIENT
Start: 2024-01-30 | End: 2024-01-30 | Stop reason: HOSPADM

## 2024-01-30 RX ORDER — BISACODYL 5 MG
10 TABLET, DELAYED RELEASE (ENTERIC COATED) ORAL DAILY PRN
COMMUNITY
End: 2024-02-29 | Stop reason: WASHOUT

## 2024-01-30 RX ORDER — PROPOFOL 10 MG/ML
INJECTION, EMULSION INTRAVENOUS AS NEEDED
Status: DISCONTINUED | OUTPATIENT
Start: 2024-01-30 | End: 2024-01-30

## 2024-01-30 RX ORDER — MIDAZOLAM HYDROCHLORIDE 1 MG/ML
INJECTION INTRAMUSCULAR; INTRAVENOUS AS NEEDED
Status: DISCONTINUED | OUTPATIENT
Start: 2024-01-30 | End: 2024-01-30

## 2024-01-30 RX ORDER — LIDOCAINE HYDROCHLORIDE 20 MG/ML
INJECTION, SOLUTION EPIDURAL; INFILTRATION; INTRACAUDAL; PERINEURAL AS NEEDED
Status: DISCONTINUED | OUTPATIENT
Start: 2024-01-30 | End: 2024-01-30

## 2024-01-30 RX ORDER — DOCUSATE SODIUM 100 MG/1
100 CAPSULE, LIQUID FILLED ORAL 2 TIMES DAILY
Qty: 30 CAPSULE | Refills: 0 | Status: SHIPPED | OUTPATIENT
Start: 2024-01-30

## 2024-01-30 RX ORDER — SODIUM CHLORIDE, SODIUM LACTATE, POTASSIUM CHLORIDE, CALCIUM CHLORIDE 600; 310; 30; 20 MG/100ML; MG/100ML; MG/100ML; MG/100ML
100 INJECTION, SOLUTION INTRAVENOUS CONTINUOUS
Status: DISCONTINUED | OUTPATIENT
Start: 2024-01-30 | End: 2024-01-30 | Stop reason: HOSPADM

## 2024-01-30 RX ORDER — PROMETHAZINE HYDROCHLORIDE 25 MG/ML
6.25 INJECTION, SOLUTION INTRAMUSCULAR; INTRAVENOUS ONCE AS NEEDED
Status: DISCONTINUED | OUTPATIENT
Start: 2024-01-30 | End: 2024-01-30 | Stop reason: HOSPADM

## 2024-01-30 RX ORDER — SODIUM CHLORIDE 0.9 G/100ML
IRRIGANT IRRIGATION AS NEEDED
Status: DISCONTINUED | OUTPATIENT
Start: 2024-01-30 | End: 2024-01-30 | Stop reason: HOSPADM

## 2024-01-30 RX ADMIN — LIDOCAINE HYDROCHLORIDE 100 MG: 20 INJECTION, SOLUTION EPIDURAL; INFILTRATION; INTRACAUDAL; PERINEURAL at 10:55

## 2024-01-30 RX ADMIN — PROPOFOL 150 MG: 10 INJECTION, EMULSION INTRAVENOUS at 10:55

## 2024-01-30 RX ADMIN — FENTANYL CITRATE 50 MCG: 50 INJECTION, SOLUTION INTRAMUSCULAR; INTRAVENOUS at 11:34

## 2024-01-30 RX ADMIN — DEXAMETHASONE SODIUM PHOSPHATE 8 MG: 10 INJECTION, SOLUTION INTRAMUSCULAR; INTRAVENOUS at 10:59

## 2024-01-30 RX ADMIN — KETOROLAC TROMETHAMINE 30 MG: 30 INJECTION INTRAMUSCULAR; INTRAVENOUS at 11:30

## 2024-01-30 RX ADMIN — Medication 2 G: at 10:58

## 2024-01-30 RX ADMIN — MIDAZOLAM HYDROCHLORIDE 2 MG: 1 INJECTION, SOLUTION INTRAMUSCULAR; INTRAVENOUS at 10:51

## 2024-01-30 RX ADMIN — FENTANYL CITRATE 50 MCG: 50 INJECTION, SOLUTION INTRAMUSCULAR; INTRAVENOUS at 10:55

## 2024-01-30 RX ADMIN — PROPOFOL 50 MG: 10 INJECTION, EMULSION INTRAVENOUS at 11:01

## 2024-01-30 RX ADMIN — ONDANSETRON 4 MG: 2 INJECTION, SOLUTION INTRAMUSCULAR; INTRAVENOUS at 10:59

## 2024-01-30 RX ADMIN — SODIUM CHLORIDE, POTASSIUM CHLORIDE, SODIUM LACTATE AND CALCIUM CHLORIDE: 600; 310; 30; 20 INJECTION, SOLUTION INTRAVENOUS at 10:51

## 2024-01-30 RX ADMIN — FENTANYL CITRATE 50 MCG: 50 INJECTION, SOLUTION INTRAMUSCULAR; INTRAVENOUS at 11:01

## 2024-01-30 RX ADMIN — FENTANYL CITRATE 50 MCG: 50 INJECTION, SOLUTION INTRAMUSCULAR; INTRAVENOUS at 11:17

## 2024-01-30 SDOH — HEALTH STABILITY: MENTAL HEALTH: CURRENT SMOKER: 0

## 2024-01-30 ASSESSMENT — PAIN SCALES - GENERAL

## 2024-01-30 ASSESSMENT — COLUMBIA-SUICIDE SEVERITY RATING SCALE - C-SSRS
6. HAVE YOU EVER DONE ANYTHING, STARTED TO DO ANYTHING, OR PREPARED TO DO ANYTHING TO END YOUR LIFE?: NO
6. HAVE YOU EVER DONE ANYTHING, STARTED TO DO ANYTHING, OR PREPARED TO DO ANYTHING TO END YOUR LIFE?: NO
2. HAVE YOU ACTUALLY HAD ANY THOUGHTS OF KILLING YOURSELF?: NO
1. IN THE PAST MONTH, HAVE YOU WISHED YOU WERE DEAD OR WISHED YOU COULD GO TO SLEEP AND NOT WAKE UP?: NO

## 2024-01-30 NOTE — ANESTHESIA POSTPROCEDURE EVALUATION
Patient: Duane O Reid    Procedure Summary       Date: 01/30/24 Room / Location: [facility] A OR 01 / Virtual [facility] OR    Anesthesia Start: 1051 Anesthesia Stop: 1154    Procedures:       Holmium Laser Transurethral Enucleation Prostate (Prostate)      Cystoscopy with Botox Injection (Bladder) Diagnosis:       Benign prostatic hyperplasia with lower urinary tract symptoms, symptom details unspecified      Urge incontinence of urine      (Benign prostatic hyperplasia with lower urinary tract symptoms, symptom details unspecified [N40.1])      (Urge incontinence of urine [N39.41])    Surgeons: Miller Zavala MD Responsible Provider: Jhon Samson MD    Anesthesia Type: general ASA Status: 2            Anesthesia Type: general    Vitals Value Taken Time   /74 01/30/24 1201   Temp 36.3 °C (97.3 °F) 01/30/24 1142   Pulse 49 01/30/24 1202   Resp 10 01/30/24 1145   SpO2 99 % 01/30/24 1202   Vitals shown include unvalidated device data.    Anesthesia Post Evaluation    Patient location during evaluation: bedside  Patient participation: complete - patient participated  Level of consciousness: awake and alert  Pain management: adequate  Airway patency: patent  Cardiovascular status: acceptable  Respiratory status: acceptable  Hydration status: acceptable  Postoperative Nausea and Vomiting: none        There were no known notable events for this encounter.  Patient is somnolent but arousable.  No current complaints;  PACU nurse at bedside.  Pain reasonably controlled.  Normothermic.  Euvolemic and hemodynamically stable.  Stable respiratory status;  no current nausea or emesis.  Indicated PACU care given.    Jhon Samson MD

## 2024-01-30 NOTE — OP NOTE
Holmium Laser Transurethral Enucleation Prostate, Cystoscopy with Botox Injection Operative Note     Date: 2024  OR Location: [facility] A OR    Name: Duane O Reid, : 1955, Age: 68 y.o., MRN: [id number], Sex: male    Diagnosis  Pre-op Diagnosis     * Benign prostatic hyperplasia with lower urinary tract symptoms, symptom details unspecified [N40.1]     * Urge incontinence of urine [N39.41] Post-op Diagnosis     * Benign prostatic hyperplasia with lower urinary tract symptoms, symptom details unspecified [N40.1]     * Urge incontinence of urine [N39.41]     Procedures  Holmium Laser Transurethral Enucleation Prostate  96443 - LA LASER ENUCLEATION PROSTATE W/MORCELLATION    Cystoscopy with Botox Injection  81468 - LA CYSTOURETHROSCOPY INJ CHEMODENERVATION BLADDER      Surgeons      * Miller Zavala - Primary    Resident/Fellow/Other Assistant:  Surgeon(s) and Role:    Procedure Summary  Anesthesia: General  ASA: II  Anesthesia Staff: Anesthesiologist: Jhon Samson MD  CRNA: ELVIN Gill-CRNA  Estimated Blood Loss: 5mL  Intra-op Medications:   Administrations occurring from 1030 to 1230 on 24:   Medication Name Total Dose   sodium chloride 0.9 % irrigation solution 3,000 mL   onabotulinumtoxinA (Botox) injection 100 Units 100 Units   ceFAZolin (Ancef) 2 g in dextrose 5 % in water (D5W) 100 mL IV 2 g              Anesthesia Record               Intraprocedure I/O Totals          Intake    LR infusion 1000.00 mL    Total Intake 1000 mL          Specimen:   ID Type Source Tests Collected by Time   1 : PROSTATE TISSUE Tissue PROSTATE HOLEP SURGICAL PATHOLOGY EXAM Miller Zavala MD 2024 1127        Staff:   Circulator: Rafaela Kee RN; Ana Laura Coffey RN  Relief Scrub: Sushma Alvares  Scrub Person: Nita Gutierrez         Drains and/or Catheters:   Urethral Catheter Latex;Other (Comment) 22 Fr. (Active)       Tourniquet Times:         Implants:     Findings: small bilobar prostate,high  bladder neck.    Indications: Duane O Reid is an 68 y.o. male who is having surgery for Benign prostatic hyperplasia with lower urinary tract symptoms, symptom details unspecified [N40.1]  Urge incontinence of urine [N39.41].     The patient was seen in the preoperative area. The risks, benefits, complications, treatment options, non-operative alternatives, expected recovery and outcomes were discussed with the patient. The possibilities of reaction to medication, pulmonary aspiration, injury to surrounding structures, bleeding, recurrent infection, the need for additional procedures, failure to diagnose a condition, and creating a complication requiring transfusion or operation were discussed with the patient. The patient concurred with the proposed plan, giving informed consent.  The site of surgery was properly noted/marked if necessary per policy. The patient has been actively warmed in preoperative area. Preoperative antibiotics have been ordered and given within 1 hours of incision. Venous thrombosis prophylaxis have been ordered including bilateral sequential compression devices    Procedure Details:   1. HOLMIUM LASER ENUCLEATION OF THE PROSTATE       Laser Settings Used:  Cutting 2 J, 40 Hz.  Coagulation 1.5 J, 30 Hz.   Kelechi or Virtual basket used? Kelechi  Preoperative Prostate Size:  approx 45 cubic centimeters.     Prostate configuration: bilobar  Complication: none  EBL: 5 ml  Catheter: 22Fr 3 way  Antibiotics: ancef  Specimen: Morcellated prostatic tissue.    DESCRIPTION OF PROCEDURE:      The patient was brought tto he OR and after good general anesthesia was achieved, the patient was prepped and draped in dorsal lithotomy position. All pressure points were padded.  Surgical pause was performed.  The patient was identified using 2 identifiers.  The correct surgical procedure was confirmed.  All members of surgical and anesthesia team were in agreement.  The patient received prophylactic antibiotic and  the procedure started.    Cystoscopy: The patient's bladder was first entered with a 22-Guyanese rigid cystoscope with 30-degree lens.  Cystoscopic examination showed normal anterior urethra.  The posterior urethra showed a small, tight bilobar prostate with a high bladder neck.  Both ureteral orifices were identified away from the bladder neck. The cystoscope was removed and the meatus was dilated up to 28-Guyanese using sounds.  The urethra was then filled with lidocaine gel and a 26-Guyanese Coelho continuous-flow resectoscope was placed.      Using an adjustable botox injection needle set to 3mm, 100U of botox was injected intradetrusor in 1mL alliquots on the posterior bladder wall. The needle was then removed.    The holmium laser apparatus was placed through the sheath.  Using a 550-micron end-firing quartz laser fiber, a laser bridge, and a 7-Guyanese laser stabilizing catheter, the procedure was started with the above-mentioned laser setting.    A en bloc technique was performed with an initial incision at the apex and circumferentially using low energy.  We continued to develop our anterior plane at the apex, identifying the capsule and freeing up the anterior apex well within the sphincter. We then proceeded with our posterior dissection, developing the posterior space toward the bladder neck and continuing our incision laterally to 9 and 3:00.    Then we turned our attention to the lateral attachments of the prostate.  Using laser energy, taking care to avoid any damage to the sphincter, we connected our previously dissected anterior and posterior planes to completely liberate the apex of the prostate preserving the sphincter. We then continued our dissection circumferentially, freeing the prostate systematically from apex to its attachments at the bladder neck. The adenoma was then pushed into the bladder.     Once the prostate had been fully enucleated, the laser was defocused on any sources of bleeding to get  additional hemostasis.  There was good hemostasis at the conclusion of this procedure.  A few small remaining nodular adenomas were then resected from the capsule.      The laser bridge apparatus and the resectoscope were then removed and the offset Coelho nephroscope and Coelho - Ivelisse tissue morcellator were then introduced and used to completely morcellate the tissue.  Two inflows were used during this portion of the procedure to fully distend the bladder and to prevent any inadvertent bladder injury.  The bladder was then ellik'd out after insertion of the inner sheath and reinspected with the cystoscope showing no residual adenomas.  Hemostasis was ensured at the conclusion of the procedure and both ureteral orifices were confirmed and identified well away from the area of resection.  No residual adenoma could be identified.     Following that, a three-way Saldana catheter on a guide was placed into the bladder and the balloon was filled.  The bladder was irrigated near clear and the continuous irrigation was started.     The patient tolerated the procedure well and was shifted to recovery in good general condition   Complications:  None; patient tolerated the procedure well.    Disposition: PACU - hemodynamically stable.  Condition: stable         Additional Details:     Attending Attestation:     Miller Zavala  Phone Number: [phone]

## 2024-01-30 NOTE — ANESTHESIA PREPROCEDURE EVALUATION
Patient: Duane O Reid    Procedure Information       Date/Time: 01/30/24 1030    Procedures:       Holmium Laser Transurethral Enucleation Prostate      Cystoscopy with Botox Injection    Location: [facility] OR 01 / Virtual [facility] OR    Surgeons: Miller Zavala MD            Relevant Problems   Cardiovascular   (+) Abnormal EKG   (+) Benign essential hypertension   (+) Chest pain   (+) Hyperlipidemia      Endocrine   (+) Hypothyroidism      GI   (+) Esophageal reflux   (+) Hiatal hernia      Neuro/Psych   (+) Carpal tunnel syndrome      Pulmonary   (+) Lung nodule, multiple      GI/Hepatic   (+) 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 : N  Date of Last Liquid: 01/30/24  Time of Last Liquid: 0800  Date of Last Solid: 01/29/24  Time of Last Solid: 2200  Last Intake Type: Clear fluids  Time of Last Void: 0900    Last protonix 1/29/24  No GERD symptoms today     Physical Exam    Airway  Mallampati: III  TM distance: >3 FB  Neck ROM: full     Cardiovascular - normal exam     Dental    Pulmonary - normal exam     Abdominal   (+) obese             Anesthesia Plan    History of general anesthesia?: yes  History of complications of general anesthesia?: no    ASA 2     general     The patient is not a current smoker.  Patient did not smoke on day of procedure.    intravenous induction   Postoperative administration of opioids is intended.  Trial extubation is planned.  Anesthetic plan and risks discussed with patient and spouse.  Use of blood products discussed with patient and spouse who consented to blood products.    Plan discussed with CRNA.    Plan general anesthesia with LMA use for securing the  airway, peripheral IV access, and ASA standard monitors.  The possibility of blood product transfusion was also described in detail.  Risks, benefits, and alternatives to this plan were described to the patient, who indicated understanding and agreed to proceed.    Jhon Samson MD

## 2024-01-30 NOTE — DISCHARGE INSTRUCTIONS
DEPARTMENT OF UROLOGY  DISCHARGE INSTRUCTIONS -- Holmium Laser   Outpatient Surgery    C O N F I D E N T I A L   I N F O R M A T I O N    Duane O Reid        Call [phone] during regular daytime business hours (8:00 am - 5:00 pm) and after 5:00 pm and ask for the Urology resident with any questions or concerns.      If it is a life-threatening situation, proceed to the nearest emergency department.        Follow-up appointment:  trial of void, as per schedule     Thank you for the opportunity to care for you today.  Your health and healing are very important to us.  We hope we made you feel as comfortable as possible and are committed to your recovery and continued well-being.      The following is a brief overview of your transurethral prostate resection today. Some of the information contained on this summary may be confidential.  This information should be kept in your records and should be shared with your regular doctor.    Physicians:   Dr. Zavala      Procedure performed: Prostate Resection  Pending results:   pathology of tissue taken from your prostate    What to Expect During your Recovery and Home Care  Anesthesia Side Effects   You received general anesthesia today.  You may feel sleepy, tired, or have a sore throat.   You may also feel drowsiness, dizziness, or inability to think clearly.  For your safety, do not drive, drink alcoholic beverages, take any unprescribed medication or make any important decisions for 24 hours.  A responsible adult should be with you for 24 hours.        Activity and Recovery    No heavy lifting over ten pounds x2 weeks. Limit activity while urinary catheter is in place. Avoid activities that would cause pulling or tugging on your catheter.    Do not drive or operate heavy machinery while taking narcotic pain medications as these medications can alter perception, impair judgement, and slow reaction times.    Pain Control  Unfortunately, you may experience pain after  your procedure.  Adequate management can include alternative measures to help ease your pain and can include over the counter Tylenol or Advil can be taken as prescribed as needed for breakthrough pain. Do not take more than 4,000mg of Tylenol in a 24-hour period.      You may also experience bladder spasms due to the catheter.     Nausea/Vomiting   Clear liquids are best tolerated at first. Start slow, advance your diet as tolerated to normal foods. Avoid spicy, greasy, heavy foods at first. Also, you may feel nauseous or like you need to vomit if you take any type of medication on an empty stomach.  Call your physician if you are unable to eat or drink and have persistent vomiting.    Signs of Bleeding   You are going to have some blood in your urine. Your urine will be light pink to yellow. You always want to look at the urine in the tubing of your catheter and not in the large urine collection bag to check for bleeding. If urine becomes thick dark red, has large clots or stops draining, please notify your physician.    Treatment/wound care:   Keep area(s) clean and dry. Clean around the tip or your penis were the catheter goes in daily with mild soap and water.  It is okay to shower 24 hours after time of surgery.    Do not submerge your catheter in standing water until seen for follow up appointment (no tub bathing, swimming, or hot tubs).      Signs of Infection  Signs of infection can include fever, drainage(green/yellow), chills, burning sensation with passing of urine, catheter leakage, or severe abdominal pain.  If you see any of these occur, please contact your doctor's office at [phone].  Any fever higher than 100.4, especially if associated with an ill feeling, abdominal pain, chills, or nausea should be reported to your surgeon.      Assist in bowel movements/urination  Increase fiber in diet  Increase water (6 to 8 glasses)  Increase walking     Additional Instructions: CATHETER CARE  Always keep  the catheters tubing and drainage bag below the level of your bladder.  Avoid loops and kinks in the catheter tubing.  NOTIFY your physician if catheter falls out or catheter seems clogged and urine is not draining.   Do not wear the small leg bag to bed you should be provided with a larger overnight bag that you should wear to bed and can hang over the side of the bed.  We recommend wearing the large bag in the shower, as this is easy to dry, and you do not get your leg straps wet from your leg bag.   Your catheter should be secured to your upper thigh, do not allow it to hang or dangle.  Your catheter will be removed at your post-operative appointment.

## 2024-01-30 NOTE — NURSING NOTE
1350: Handoff received from Linda ENGLE. Pt sipping ginger ale and eating pretzels at this time    1400: 1300ml emptied from allen bag at this time. Bladder irrigation port on 3 way allen catheter plugged at this time.    1414: Pt meets phase 1 discharge criteria at this time    1415: Pt to phase 2 at this time, handoff to Crys ENGLE

## 2024-01-30 NOTE — ANESTHESIA PROCEDURE NOTES
Airway  Date/Time: 1/30/2024 10:56 AM  Urgency: elective      Staffing  Performed: CRNA   Authorized by: Jhon Samson MD    Performed by: ELVIN Gill-KENJI  Patient location during procedure: OR    Indications and Patient Condition  Indications for airway management: anesthesia  Spontaneous Ventilation: absent  Sedation level: deep  Preoxygenated: yes  Mask difficulty assessment: 0 - not attempted    Final Airway Details  Final airway type: supraglottic airway      Successful airway: classic  Size 4     Number of attempts at approach: 1  Ventilation between attempts: none

## 2024-02-01 ENCOUNTER — OFFICE VISIT (OUTPATIENT)
Dept: UROLOGY | Facility: HOSPITAL | Age: 69
End: 2024-02-01
Payer: COMMERCIAL

## 2024-02-01 ENCOUNTER — APPOINTMENT (OUTPATIENT)
Dept: UROLOGY | Facility: CLINIC | Age: 69
End: 2024-02-01
Payer: COMMERCIAL

## 2024-02-01 DIAGNOSIS — R35.0 BENIGN PROSTATIC HYPERPLASIA WITH URINARY FREQUENCY: Primary | ICD-10-CM

## 2024-02-01 DIAGNOSIS — N40.1 BENIGN PROSTATIC HYPERPLASIA WITH URINARY FREQUENCY: Primary | ICD-10-CM

## 2024-02-01 PROCEDURE — 1126F AMNT PAIN NOTED NONE PRSNT: CPT | Performed by: UROLOGY

## 2024-02-01 PROCEDURE — 99024 POSTOP FOLLOW-UP VISIT: CPT | Performed by: UROLOGY

## 2024-02-01 PROCEDURE — 1036F TOBACCO NON-USER: CPT | Performed by: UROLOGY

## 2024-02-01 PROCEDURE — 1159F MED LIST DOCD IN RCRD: CPT | Performed by: UROLOGY

## 2024-02-01 NOTE — PROGRESS NOTES
HPI    68 year old male with history of BPH and poorly controlled LUTS with Tamsulosin 0.4mg.      1/23/23 RBUS - Suspected enlarged prostate is incompletely visualized.     PSA 1/2023 - 3.3     1/3/22 MRI prostate - No evidence of clinically significant prostate cancer. No pelvic lymphadenopathy. Prostate is approx 39g     RBUS 1/23/2023 - PVR 106cc     1/31/23 -Seen in consult. Reports frequency and urgency every 2 hours during the day, nocturia 4x at night and voids large volumes. Incomplete bladder emptying. Stream is strong on tamsulosin once or twice during the day, weak off flomax. Recently started Vesicare. Denies any hematuria or UTI. Was previously seeing another urologist who recommended Urolift, would not like to get this done. drinks a lot of water day and night.      4/5/23 - here for cyssto. urgency, frequency no better. Cysto revealed trilobar, very obstructive, moderately sized prostate.          11/03/2023 - seen today to discuss surgery, per pt request. He is interested in a TURP.    1/30/24 - outpatient HolEP + 100U botox    2/1/24 - seen for TOV, 200cc in, 225cc out. Urine clear, no issues    Lab Results   Component Value Date    PSA 3.43 05/02/2022    PSA 3.9 12/02/2021    PSA 4.0 09/09/2021         Current Medications:  Current Outpatient Medications   Medication Sig Dispense Refill    acetaminophen (Tylenol 8 HOUR) 650 mg ER tablet Take 1 tablet (650 mg) by mouth every 8 hours if needed for mild pain (1 - 3). Do not crush, chew, or split.      aspirin 81 mg EC tablet Take 1 tablet (81 mg) by mouth once daily.      bisacodyl (Dulcolax) 5 mg EC tablet Take 2 tablets (10 mg) by mouth once daily as needed for constipation. Do not crush, chew, or split.      docusate sodium (Colace) 100 mg capsule Take 1 capsule (100 mg) by mouth 2 times a day. Hold for loose stools 30 capsule 0    folic acid/multivit-min/lutein (CENTRUM SILVER ORAL) Take 1 tablet by mouth once daily.      ibuprofen 600 mg tablet  Take 1 tablet (600 mg) by mouth 3 times a day as needed for mild pain (1 - 3).      loratadine (Claritin) 10 mg tablet Take 1 tablet (10 mg) by mouth once daily as needed.      oxyCODONE (Roxicodone) 5 mg immediate release tablet Take 1 tablet (5 mg) by mouth every 6 hours if needed for severe pain (7 - 10) for up to 3 days. 4 tablet 0    pantoprazole (ProtoNix) 40 mg EC tablet Take 1 tablet (40 mg) by mouth once daily. Do not crush, chew, or split. 90 tablet 3    phenazopyridine (Pyridium) 100 mg tablet Take 1 tablet (100 mg) by mouth 3 times a day as needed (burning). 15 tablet 0    sulfamethoxazole-trimethoprim (Bactrim DS) 800-160 mg tablet Take 1 tablet by mouth 2 times a day for 3 days. 6 tablet 0     No current facility-administered medications for this visit.        Active Problems:  Duane O Reid is a 68 y.o. male with the following Problems and Medications.  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)    Adverse effect of radiation    Allergic conjunctivitis    Allergic reaction    Allergic rhinitis    Anemia    Arcus senilis of eye    Benign essential hypertension    Bilateral impacted cerumen    Blurred vision, bilateral    Carpal tunnel syndrome    Cataracts, both eyes    Chest pain    Chronic eczematous otitis externa of both ears    Dizziness    Dry ear canal    BPH (benign prostatic hyperplasia)    Enlarged prostate with lower urinary tract symptoms (LUTS)    Enlarged prostate without lower urinary tract symptoms (luts)    Epiretinal membrane (ERM) of left eye    Erectile dysfunction    Hearing loss    Herniated nucleus pulposus, L5-S1, left    Esophageal reflux    Hiatal hernia    Hypothyroidism    Hyperlipidemia    Leg swelling    Lower back pain    Lower urinary tract symptoms (LUTS)    Lump of ear    Mass of parotid gland    Nocturia    Nuclear sclerosis    Overactive bladder    Prostatitis    Lung nodule, multiple    Pulmonary  nodule    PVD (posterior vitreous detachment), both eyes    Refractive error    Scalp cyst    Sinusitis    Vitamin D deficiency    Vitreous floaters    Wears eyeglasses    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 Do not crush, chew, or split.      aspirin 81 mg EC tablet Take 1 tablet (81 mg) by mouth once daily.      bisacodyl (Dulcolax) 5 mg EC tablet Take 2 tablets (10 mg) by mouth once daily as needed for constipation. Do not crush, chew, or split.      docusate sodium (Colace) 100 mg capsule Take 1 capsule (100 mg) by mouth 2 times a day. Hold for loose stools 30 capsule 0    folic acid/multivit-min/lutein (CENTRUM SILVER ORAL) Take 1 tablet by mouth once daily.      ibuprofen 600 mg tablet Take 1 tablet (600 mg) by mouth 3 times a day as needed for mild pain (1 - 3).      loratadine (Claritin) 10 mg tablet Take 1 tablet (10 mg) by mouth once daily as needed.      oxyCODONE (Roxicodone) 5 mg immediate release tablet Take 1 tablet (5 mg) by mouth every 6 hours if needed for severe pain (7 - 10) for up to 3 days. 4 tablet 0    pantoprazole (ProtoNix) 40 mg EC tablet Take 1 tablet (40 mg) by mouth once daily. Do not crush, chew, or split. 90 tablet 3    phenazopyridine (Pyridium) 100 mg tablet Take 1 tablet (100 mg) by mouth 3 times a day as needed (burning). 15 tablet 0    sulfamethoxazole-trimethoprim (Bactrim DS) 800-160 mg tablet Take 1 tablet by mouth 2 times a day for 3 days. 6 tablet 0     No current facility-administered medications for this visit.       PMH:  Past Medical History:   Diagnosis Date    Anemia 09/01/2023    H/H 12/37.1    BPH (benign prostatic  hyperplasia)     w/ LUTS    Chronic headaches     Facial paralysis/[place] palsy 2009    resolved    GERD (gastroesophageal reflux disease)     Head and neck cancer (CMS/[facility]) 2018    Adenoid cyst carcinoma, s/p resection x 2, chemo and radiation    Hearing aid worn     bilateral    HL (hearing loss)     Lumbar disc herniation     L5-S1    Osteoarthritis     Other vitreous opacities, unspecified eye     Vitreous floaters    Pure hypercholesterolemia, unspecified 2013    Low-density-lipoid-type (LDL) hyperlipoproteinemia    Sickle cell trait (CMS/[facility])     Sleep apnea     uses CPAP intermittently-doesn't like to use       PSH:  Past Surgical History:   Procedure Laterality Date    HEMORRHOID SURGERY  1980    KNEE ARTHROPLASTY Right 2018    SALIVARY GLAND SURGERY  2018    resection    SALIVARY GLAND SURGERY  2020    revision    TOTAL KNEE ARTHROPLASTY Left        FMH:  Family History   Problem Relation Name Age of Onset    Eczema Mother          age 98    Diabetes type II Father           age 96    Arthritis Sister      Diabetes Brother      Addiction problem Half-Sister          overdose    No Known Problems Half-Sister         SHx:  Social History     Tobacco Use    Smoking status: Former     Packs/day: 0.50     Years: 8.00     Additional pack years: 0.00     Total pack years: 4.00     Types: Cigarettes     Quit date:      Years since quittin.1     Passive exposure: Never    Smokeless tobacco: Never   Substance Use Topics    Alcohol use: Yes     Comment: RARELY-1-2 drinks a month    Drug use: Not Currently     Types: Marijuana     Comment: quit        Allergies:  Allergies   Allergen Reactions    Naproxen Other     Blood in stool    Niacin Other     Patient doesn't remember       Assessment/Plan    Successful TOV, reviewed postop instructions, expectations. Will see in 2 weeks for PVR, symptom check      Scribe Attestation  By signing my name below, Laura OATES, Litzy, attest that  this documentation  has been prepared under the direction and in the presence of Miller Zavala MD.

## 2024-02-02 LAB
LABORATORY COMMENT REPORT: NORMAL
PATH REPORT.FINAL DX SPEC: NORMAL
PATH REPORT.GROSS SPEC: NORMAL
PATH REPORT.RELEVANT HX SPEC: NORMAL
PATH REPORT.TOTAL CANCER: NORMAL

## 2024-02-06 ENCOUNTER — APPOINTMENT (OUTPATIENT)
Dept: OPHTHALMOLOGY | Facility: CLINIC | Age: 69
End: 2024-02-06
Payer: COMMERCIAL

## 2024-02-20 NOTE — PROGRESS NOTES
HPI  68 year old male with history of BPH and poorly controlled LUTS with Tamsulosin 0.4mg.      1/23/23 RBUS - Suspected enlarged prostate is incompletely visualized.     PSA 1/2023 - 3.3     1/3/22 MRI prostate - No evidence of clinically significant prostate cancer. No pelvic lymphadenopathy. Prostate is approx 39g     RBUS 1/23/2023 - PVR 106cc     1/31/23 -Seen in consult. Reports frequency and urgency every 2 hours during the day, nocturia 4x at night and voids large volumes. Incomplete bladder emptying. Stream is strong on tamsulosin once or twice during the day, weak off flomax. Recently started Vesicare. Denies any hematuria or UTI. Was previously seeing another urologist who recommended Urolift, would not like to get this done. drinks a lot of water day and night.      4/5/23 - here for cyssto. urgency, frequency no better. Cysto revealed trilobar, very obstructive, moderately sized prostate.          11/03/2023 - seen today to discuss surgery, per pt request. He is interested in a TURP.        1/30/24 - outpatient HolEP + 100U botox. Path 16g, benign.        2/1/24 - seen for TOV, 200cc in, 225cc out. Urine clear, no issues        2/21/24 - seen for PVR. PVR 44cc. Patient reports an improved stream, improved nocturia. He has no leakage.    Lab Results   Component Value Date    PSA 3.43 05/02/2022    PSA 3.9 12/02/2021    PSA 4.0 09/09/2021         Current Medications:  Current Outpatient Medications   Medication Sig Dispense Refill    acetaminophen (Tylenol 8 HOUR) 650 mg ER tablet Take 1 tablet (650 mg) by mouth every 8 hours if needed for mild pain (1 - 3). Do not crush, chew, or split.      aspirin 81 mg EC tablet Take 1 tablet (81 mg) by mouth once daily.      bisacodyl (Dulcolax) 5 mg EC tablet Take 2 tablets (10 mg) by mouth once daily as needed for constipation. Do not crush, chew, or split.      docusate sodium (Colace) 100 mg capsule Take 1 capsule (100 mg) by mouth 2 times a day. Hold for loose  stools 30 capsule 0    folic acid/multivit-min/lutein (CENTRUM SILVER ORAL) Take 1 tablet by mouth once daily.      ibuprofen 600 mg tablet Take 1 tablet (600 mg) by mouth 3 times a day as needed for mild pain (1 - 3).      loratadine (Claritin) 10 mg tablet Take 1 tablet (10 mg) by mouth once daily as needed.      pantoprazole (ProtoNix) 40 mg EC tablet Take 1 tablet (40 mg) by mouth once daily. Do not crush, chew, or split. 90 tablet 3    phenazopyridine (Pyridium) 100 mg tablet Take 1 tablet (100 mg) by mouth 3 times a day as needed (burning). 15 tablet 0     No current facility-administered medications for this visit.        Active Problems:  Duane O Reid is a 68 y.o. male with the following Problems and Medications.  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)    Adverse effect of radiation    Allergic conjunctivitis    Allergic reaction    Allergic rhinitis    Anemia    Arcus senilis of eye    Benign essential hypertension    Bilateral impacted cerumen    Blurred vision, bilateral    Carpal tunnel syndrome    Cataracts, both eyes    Chest pain    Chronic eczematous otitis externa of both ears    Dizziness    Dry ear canal    BPH (benign prostatic hyperplasia)    Enlarged prostate with lower urinary tract symptoms (LUTS)    Enlarged prostate without lower urinary tract symptoms (luts)    Epiretinal membrane (ERM) of left eye    Erectile dysfunction    Hearing loss    Herniated nucleus pulposus, L5-S1, left    Esophageal reflux    Hiatal hernia    Hypothyroidism    Hyperlipidemia    Leg swelling    Lower back pain    Lower urinary tract symptoms (LUTS)    Lump of ear    Mass of parotid gland    Nocturia    Nuclear sclerosis    Overactive bladder    Prostatitis    Lung nodule, multiple    Pulmonary nodule    PVD (posterior vitreous detachment), both eyes    Refractive error    Scalp cyst    Sinusitis    Vitamin D deficiency    Vitreous floaters     Wears eyeglasses    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 Do not crush, chew, or split.      aspirin 81 mg EC tablet Take 1 tablet (81 mg) by mouth once daily.      bisacodyl (Dulcolax) 5 mg EC tablet Take 2 tablets (10 mg) by mouth once daily as needed for constipation. Do not crush, chew, or split.      docusate sodium (Colace) 100 mg capsule Take 1 capsule (100 mg) by mouth 2 times a day. Hold for loose stools 30 capsule 0    folic acid/multivit-min/lutein (CENTRUM SILVER ORAL) Take 1 tablet by mouth once daily.      ibuprofen 600 mg tablet Take 1 tablet (600 mg) by mouth 3 times a day as needed for mild pain (1 - 3).      loratadine (Claritin) 10 mg tablet Take 1 tablet (10 mg) by mouth once daily as needed.      pantoprazole (ProtoNix) 40 mg EC tablet Take 1 tablet (40 mg) by mouth once daily. Do not crush, chew, or split. 90 tablet 3    phenazopyridine (Pyridium) 100 mg tablet Take 1 tablet (100 mg) by mouth 3 times a day as needed (burning). 15 tablet 0     No current facility-administered medications for this visit.       PMH:  Past Medical History:   Diagnosis Date    Anemia 09/01/2023    H/H 12/37.1    BPH (benign prostatic hyperplasia)     w/ LUTS    Chronic headaches     Facial paralysis/[place] palsy 2009    resolved    GERD (gastroesophageal reflux disease)     Head and neck cancer (CMS/[facility]) 2018    Adenoid cyst carcinoma, s/p resection x 2, chemo and radiation    Hearing aid worn     bilateral    HL (hearing loss)     Lumbar disc herniation     L5-S1    Osteoarthritis     Other vitreous opacities, unspecified eye     Vitreous floaters    Pure  hypercholesterolemia, unspecified 2013    Low-density-lipoid-type (LDL) hyperlipoproteinemia    Sickle cell trait (CMS/HCC)     Sleep apnea     uses CPAP intermittently-doesn't like to use       PSH:  Past Surgical History:   Procedure Laterality Date    HEMORRHOID SURGERY  1980    KNEE ARTHROPLASTY Right 2018    SALIVARY GLAND SURGERY  2018    resection    SALIVARY GLAND SURGERY  2020    revision    TOTAL KNEE ARTHROPLASTY Left 2011       FMH:  Family History   Problem Relation Name Age of Onset    Eczema Mother          age 98    Diabetes type II Father           age 96    Arthritis Sister      Diabetes Brother      Addiction problem Half-Sister          overdose    No Known Problems Half-Sister         SHx:  Social History     Tobacco Use    Smoking status: Former     Packs/day: 0.50     Years: 8.00     Additional pack years: 0.00     Total pack years: 4.00     Types: Cigarettes     Quit date:      Years since quittin.1     Passive exposure: Never    Smokeless tobacco: Never   Substance Use Topics    Alcohol use: Yes     Comment: RARELY-1-2 drinks a month    Drug use: Not Currently     Types: Marijuana     Comment: quit        Allergies:  Allergies   Allergen Reactions    Naproxen Other     Blood in stool    Niacin Other     Patient doesn't remember       Assessment/Plan  Patient is doing very well 2 weeks out from surgery. He already has improved stream and nocturia. He is not leaking. We discussed the postoperative expectations along with healing timeline.    Follow up in 3 months with PVR.    Scribe Attestation  By signing my name below, I, Litzy Martinez, attest that this documentation  has been prepared under the direction and in the presence of Miller Zavala MD.

## 2024-02-20 NOTE — PROGRESS NOTES
Cancer follow up  TSH: Due 1/25  Lab Results   Component Value Date    TSH 1.65 01/23/2024    Chest: Due 4/24    Chief Complaint   Patient presents with    Follow-up     Jaw locking and sharp pain on right side     HPI:  Duane O Reid is a 68 y.o. male following up with me today for his recurrent right parotid adenoid cystic carcinoma. Last seen 9/23. Patient is s/p revision right skin nodules, right inferior parotidectomy without dissection of the facial nerve, excision of right neck skin on 11/6/20. Completed adjuvant proton radiation with Dr. Acharya on 2/15/21. Underwent Holmium Laser Transurethral Enucleation Prostate and Cystoscopy with Botox Injection (Bladder) on 1/30/24. He reports he went to [place] 2/8 and had issues with trismus and pain which happened after his multiple flights. He didn't have direct flights so he took 4 flights in a short period of time.  The pain developed during the time when he was traveling.  He also noticed his neck was swollen when he was in [place] before he flew to [place]. He reports the pain has not been as sharp since he has been home.     History:  Dx: Right parotid adenoid cystic carcinoma 2018  Dx#2: Recurrent right parotid adenoid cystic carcinoma 2020 6/12/18 CT neck with contrast: negative for visible mass within the right parotid, no pathologic lymphadenopathy.   6/22/18 MRI +[id number] nodule within the right parotid. This is well encapsulated.  7/25/18: US guided FNA +adenoid cystic carcinoma   8/31/18: S/p right total parotidectomy. Final path +adenoid cystic carcinoma, near margin  3/4/19: MRI enhancement within the right parotid bed but no evidence of discrete parotid mass  6/26/19: No clinical evidence of recurrence  9/11/19: MRI decreased enhancement of the right parotid bed, no recurrence, no FN enhancement  9/8/20: MRI decreased enhancement within the right parotid bed. There are two small enhancing nodules within the right neck subcutaneous fat which have increased  in size compared to prior MRI.   9/9/20: FNA of R neck mass. Path + adenoid cystic carcinoma   10/14/20: CT chest w/o contrast stable 4mm right lung nodule.   11/6/20: S/p revision right parotidectomy without dissection of the facial nerve, excision of right neck skin. Path revealed recurrent adenoid cystic carcinoma. This measured 1.2cm in size. There was +perineural involvement. The skin over the area where the nodule was located was resected and there was adenoid cystic carcinoma found within the skin with the perineural involvement.  11/20/20: Tumor board recommendation adjuvant radiation   2/15/21: Scheduled to finish radiation with Dr. Acharya   5/13/21: 3 month post treatment PET negative.  7/21: +lymphedema, mild  5/2/22: TSH normal  5/16/22: CT neck and chest w/o contrast- negative   1/23: TSH normal  5/23: CT neck and chest negative for recurrence or new masses.  9/23: TSH normal  2/24: Neck pain/swelling with traveling, improved since he's been home     SH:  Tob: 3/4ppd, quit 1990s, h/o marijuana quit 1994  ETOH: 4x/week    ROS:  Review of Systems   Constitutional:  Negative for appetite change, chills, fatigue, fever and unexpected weight change.   HENT:  Negative for dental problem, drooling, ear pain, facial swelling, hearing loss, mouth sores, sore throat, tinnitus, trouble swallowing and voice change.    Respiratory:  Negative for cough, shortness of breath and stridor.    Gastrointestinal:  Negative for nausea and vomiting.   Musculoskeletal:  Positive for neck pain.   Hematological:  Negative for adenopathy.   All other systems reviewed and are negative.       PE:  ENT Physical Exam  Constitutional  Appearance: patient appears well-developed and well-groomed, patient is cooperative;  Communication/Voice: communication appropriate for developmental age;  Head and Face  Appearance: head appears normal and face appears normal;  Salivary: Salivary comments: s/p right parotidectomy, scar is well healed.  No  recurrent masses or lesions.  Ear  Hearing: impaired to conversational voice (HA removed);  Auricles: right auricle normal; left auricle normal;  Ear Canals: right ear canal normal; left ear canal normal;  Tympanic Membranes: right tympanic membrane normal; left tympanic membrane normal;  Nose  External Nose: nares patent bilaterally; external nose normal;  Internal Nose: nasal mucosa normal; septum normal;  Oral Cavity/Oropharynx  Lips: normal;  Gums: gingiva normal;  Tongue: normal;  Oral mucosa: normal;  OC/OP comments: OP clear, no masses or lesions  Neck  Neck: scars (healed right parotidectomy scar) present;  Thyroid: thyroid normal;  Respiratory  Inspection: breathing unlabored;  Cardiovascular  Inspection: extremities are warm and well perfused;  Lymphatic  Palpation: lymph nodes normal;  Neurovestibular  Mental Status: alert and oriented;  Psychiatric: mood normal; affect is appropriate;  Cranial Nerves: cranial nerves intact; no facial weakness noted;       Procedures     ASSESSMENT AND PLAN:  Problem List Items Addressed This Visit       Adenoid cystic carcinoma of parotid gland (CMS/HCC)    Current Assessment & Plan     No evidence of disease however he had recent worsening pain/trismus and some neck swelling with traveling.  This may have been related to his airplane travel but I recommend proceeding with neck US to ensure that there are no new masses or lesions.  Ordered today.  Will call with results.  - Follow up in 6 months if CT imaging is negative, otherwise will arrange for sooner follow up         Relevant Orders    US neck (Completed)    Adverse effect of radiation - Primary    Current Assessment & Plan     Chronic, mild  Continue conservative tx           Krysta Abdalla MD    Head & Neck Surgical Oncology & Reconstruction  Department of Otolaryngology - Head and Neck Surgery     By signing my name below, I, Litzy Hogan, attest that this documentation has been prepared under the  direction and in the presence of Dr. Krysta Abdalla MD.     All medical record entries made by the Scribe were at my direction and personally dictated by me, Dr. Krysta Abdalla. I have reviewed the chart and agree that the record accurately reflects my personal performance of the history, physical exam, discussion and plan.

## 2024-02-21 ENCOUNTER — OFFICE VISIT (OUTPATIENT)
Dept: OTOLARYNGOLOGY | Facility: HOSPITAL | Age: 69
End: 2024-02-21
Payer: COMMERCIAL

## 2024-02-21 ENCOUNTER — OFFICE VISIT (OUTPATIENT)
Dept: UROLOGY | Facility: HOSPITAL | Age: 69
End: 2024-02-21
Payer: COMMERCIAL

## 2024-02-21 ENCOUNTER — APPOINTMENT (OUTPATIENT)
Dept: UROLOGY | Facility: HOSPITAL | Age: 69
End: 2024-02-21
Payer: COMMERCIAL

## 2024-02-21 VITALS — TEMPERATURE: 97.5 F | BODY MASS INDEX: 30.18 KG/M2 | WEIGHT: 187 LBS

## 2024-02-21 DIAGNOSIS — T66.XXXD ADVERSE EFFECT OF RADIATION, SUBSEQUENT ENCOUNTER: Primary | ICD-10-CM

## 2024-02-21 DIAGNOSIS — R35.1 NOCTURIA: ICD-10-CM

## 2024-02-21 DIAGNOSIS — N40.1 BENIGN PROSTATIC HYPERPLASIA WITH URINARY FREQUENCY: Primary | ICD-10-CM

## 2024-02-21 DIAGNOSIS — R35.0 BENIGN PROSTATIC HYPERPLASIA WITH URINARY FREQUENCY: Primary | ICD-10-CM

## 2024-02-21 DIAGNOSIS — C07 ADENOID CYSTIC CARCINOMA OF PAROTID GLAND (MULTI): ICD-10-CM

## 2024-02-21 PROCEDURE — 1126F AMNT PAIN NOTED NONE PRSNT: CPT | Performed by: UROLOGY

## 2024-02-21 PROCEDURE — 1160F RVW MEDS BY RX/DR IN RCRD: CPT | Performed by: OTOLARYNGOLOGY

## 2024-02-21 PROCEDURE — 1126F AMNT PAIN NOTED NONE PRSNT: CPT | Performed by: OTOLARYNGOLOGY

## 2024-02-21 PROCEDURE — 99213 OFFICE O/P EST LOW 20 MIN: CPT | Performed by: OTOLARYNGOLOGY

## 2024-02-21 PROCEDURE — 1036F TOBACCO NON-USER: CPT | Performed by: UROLOGY

## 2024-02-21 PROCEDURE — 1159F MED LIST DOCD IN RCRD: CPT | Performed by: OTOLARYNGOLOGY

## 2024-02-21 PROCEDURE — 99024 POSTOP FOLLOW-UP VISIT: CPT | Performed by: UROLOGY

## 2024-02-21 PROCEDURE — 1159F MED LIST DOCD IN RCRD: CPT | Performed by: UROLOGY

## 2024-02-21 PROCEDURE — 1036F TOBACCO NON-USER: CPT | Performed by: OTOLARYNGOLOGY

## 2024-02-21 ASSESSMENT — ENCOUNTER SYMPTOMS
FEVER: 0
STRIDOR: 0
NAUSEA: 0
CHILLS: 0
SHORTNESS OF BREATH: 0
NECK PAIN: 1
ADENOPATHY: 0
FACIAL SWELLING: 0
TROUBLE SWALLOWING: 0
SORE THROAT: 0
FATIGUE: 0
APPETITE CHANGE: 0
UNEXPECTED WEIGHT CHANGE: 0
VOICE CHANGE: 0
VOMITING: 0
COUGH: 0

## 2024-02-21 ASSESSMENT — PATIENT HEALTH QUESTIONNAIRE - PHQ9
SUM OF ALL RESPONSES TO PHQ9 QUESTIONS 1 & 2: 0
2. FEELING DOWN, DEPRESSED OR HOPELESS: NOT AT ALL
1. LITTLE INTEREST OR PLEASURE IN DOING THINGS: NOT AT ALL

## 2024-02-21 NOTE — LETTER
February 21, 2024     Patient: Duane O Reid   YOB: 1955   Date of Visit: 2/21/2024       To Whom It May Concern:    It is my medical opinion that Duane Reid may return to work on Monday February 26, 2024    If you have any questions or concerns, please don't hesitate to call.         Sincerely,        Miller Zavala MD    CC: No Recipients

## 2024-02-21 NOTE — PATIENT INSTRUCTIONS
Dr. Abdalla evaluated you today.    Your care plan is outlined below:  -- Massage for the neck   -- Neck US recommended.     General appointment line please call [phone]  For general questions or scheduling issues please call [phone] option #2   For medical questions or surgery scheduling please call [phone] on Mondays, Wednesdays and Thursdays or [phone] on Tuesdays and Fridays. Please be sure to leave a voice mail or your call will not be able to be returned.     Dr. Abdalla makes every effort to run on time for your appointments.  Therefore, if you are more than 30 minutes late for your appointment, unrelated to a scan or another appointment such as chemotherapy or radiation, your appointment will need to be rescheduled to another day.  We appreciate your understanding.

## 2024-02-22 ENCOUNTER — HOSPITAL ENCOUNTER (OUTPATIENT)
Dept: RADIOLOGY | Facility: CLINIC | Age: 69
Discharge: HOME | End: 2024-02-22
Payer: COMMERCIAL

## 2024-02-22 DIAGNOSIS — C07 ADENOID CYSTIC CARCINOMA OF PAROTID GLAND (MULTI): ICD-10-CM

## 2024-02-22 PROCEDURE — 76536 US EXAM OF HEAD AND NECK: CPT

## 2024-02-22 PROCEDURE — 76536 US EXAM OF HEAD AND NECK: CPT | Performed by: STUDENT IN AN ORGANIZED HEALTH CARE EDUCATION/TRAINING PROGRAM

## 2024-02-23 ENCOUNTER — HOSPITAL ENCOUNTER (OUTPATIENT)
Dept: RADIOLOGY | Facility: CLINIC | Age: 69
Discharge: HOME | End: 2024-02-23
Payer: COMMERCIAL

## 2024-02-23 DIAGNOSIS — M85.89 OTHER SPECIFIED DISORDERS OF BONE DENSITY AND STRUCTURE, MULTIPLE SITES: ICD-10-CM

## 2024-02-23 PROCEDURE — 77080 DXA BONE DENSITY AXIAL: CPT | Performed by: RADIOLOGY

## 2024-02-23 PROCEDURE — 77080 DXA BONE DENSITY AXIAL: CPT

## 2024-02-28 ENCOUNTER — TELEPHONE (OUTPATIENT)
Dept: OTOLARYNGOLOGY | Facility: HOSPITAL | Age: 69
End: 2024-02-28
Payer: COMMERCIAL

## 2024-02-28 DIAGNOSIS — E04.1 THYROID NODULE: Primary | ICD-10-CM

## 2024-02-28 NOTE — TELEPHONE ENCOUNTER
Called patient and reviewed results of neck US.  No evidence of recurrence of parotid nodules or lymphadenopathy.  Reviewed incidental thyroid nodules.  Previously seen subcentimeter thyroid nodules on CT scans.  Now thyroid US with 1.6cm thyroid nodule on the left TIRADS4. Discussed US guided FNA vs observation.  Will obs and repeat thyroid US in 6 months.  If growth will obtain US guided FNA.  I did explain the diagnosis of thyroid nodules and the plans for evaluation and treatment of the problem.  I answered all of the patients questions.  They did appear to understand and confirmed this, and do agree to proceed as outlined above.     Krysta Abdalla MD    Head & Neck Surgical Oncology & Reconstruction  Department of Otolaryngology - Head and Neck Surgery

## 2024-03-01 NOTE — ASSESSMENT & PLAN NOTE
No evidence of disease however he had recent worsening pain/trismus and some neck swelling with traveling.  This may have been related to his airplane travel but I recommend proceeding with neck US to ensure that there are no new masses or lesions.  Ordered today.  Will call with results.  - Follow up in 6 months if CT imaging is negative, otherwise will arrange for sooner follow up

## 2024-03-22 ENCOUNTER — APPOINTMENT (OUTPATIENT)
Dept: UROLOGY | Facility: HOSPITAL | Age: 69
End: 2024-03-22
Payer: COMMERCIAL

## 2024-04-25 ENCOUNTER — OFFICE VISIT (OUTPATIENT)
Dept: PRIMARY CARE | Facility: CLINIC | Age: 69
End: 2024-04-25
Payer: COMMERCIAL

## 2024-04-25 ENCOUNTER — HOSPITAL ENCOUNTER (OUTPATIENT)
Dept: RADIOLOGY | Facility: CLINIC | Age: 69
Discharge: HOME | End: 2024-04-25
Payer: COMMERCIAL

## 2024-04-25 ENCOUNTER — LAB (OUTPATIENT)
Dept: LAB | Facility: LAB | Age: 69
End: 2024-04-25
Payer: COMMERCIAL

## 2024-04-25 VITALS
DIASTOLIC BLOOD PRESSURE: 74 MMHG | BODY MASS INDEX: 30.53 KG/M2 | WEIGHT: 190 LBS | SYSTOLIC BLOOD PRESSURE: 132 MMHG | HEIGHT: 66 IN

## 2024-04-25 DIAGNOSIS — E78.5 HYPERLIPIDEMIA, UNSPECIFIED HYPERLIPIDEMIA TYPE: ICD-10-CM

## 2024-04-25 DIAGNOSIS — M79.89 LEG SWELLING: ICD-10-CM

## 2024-04-25 DIAGNOSIS — I82.90 DEEP VEIN THROMBOSIS (DVT) OF NON-EXTREMITY VEIN, UNSPECIFIED CHRONICITY: ICD-10-CM

## 2024-04-25 DIAGNOSIS — I10 BENIGN ESSENTIAL HYPERTENSION: ICD-10-CM

## 2024-04-25 DIAGNOSIS — M25.572 LEFT ANKLE PAIN, UNSPECIFIED CHRONICITY: ICD-10-CM

## 2024-04-25 DIAGNOSIS — M10.9 GOUT, UNSPECIFIED CAUSE, UNSPECIFIED CHRONICITY, UNSPECIFIED SITE: ICD-10-CM

## 2024-04-25 LAB
ALBUMIN SERPL BCP-MCNC: 4.3 G/DL (ref 3.4–5)
ALP SERPL-CCNC: 68 U/L (ref 33–136)
ALT SERPL W P-5'-P-CCNC: 16 U/L (ref 10–52)
ANION GAP SERPL CALC-SCNC: 14 MMOL/L (ref 10–20)
AST SERPL W P-5'-P-CCNC: 18 U/L (ref 9–39)
BASOPHILS # BLD AUTO: 0.05 X10*3/UL (ref 0–0.1)
BASOPHILS NFR BLD AUTO: 0.8 %
BILIRUB SERPL-MCNC: 1.2 MG/DL (ref 0–1.2)
BUN SERPL-MCNC: 10 MG/DL (ref 6–23)
CALCIUM SERPL-MCNC: 9.3 MG/DL (ref 8.6–10.6)
CHLORIDE SERPL-SCNC: 105 MMOL/L (ref 98–107)
CO2 SERPL-SCNC: 29 MMOL/L (ref 21–32)
CREAT SERPL-MCNC: 0.82 MG/DL (ref 0.5–1.3)
EGFRCR SERPLBLD CKD-EPI 2021: >90 ML/MIN/1.73M*2
EOSINOPHIL # BLD AUTO: 0.12 X10*3/UL (ref 0–0.7)
EOSINOPHIL NFR BLD AUTO: 1.8 %
ERYTHROCYTE [DISTWIDTH] IN BLOOD BY AUTOMATED COUNT: 12.5 % (ref 11.5–14.5)
GLUCOSE SERPL-MCNC: 78 MG/DL (ref 74–99)
HCT VFR BLD AUTO: 36.7 % (ref 41–52)
HGB BLD-MCNC: 12.3 G/DL (ref 13.5–17.5)
IMM GRANULOCYTES # BLD AUTO: 0.03 X10*3/UL (ref 0–0.7)
IMM GRANULOCYTES NFR BLD AUTO: 0.5 % (ref 0–0.9)
LYMPHOCYTES # BLD AUTO: 0.73 X10*3/UL (ref 1.2–4.8)
LYMPHOCYTES NFR BLD AUTO: 11.1 %
MCH RBC QN AUTO: 29.4 PG (ref 26–34)
MCHC RBC AUTO-ENTMCNC: 33.5 G/DL (ref 32–36)
MCV RBC AUTO: 88 FL (ref 80–100)
MONOCYTES # BLD AUTO: 0.69 X10*3/UL (ref 0.1–1)
MONOCYTES NFR BLD AUTO: 10.5 %
NEUTROPHILS # BLD AUTO: 4.96 X10*3/UL (ref 1.2–7.7)
NEUTROPHILS NFR BLD AUTO: 75.3 %
NRBC BLD-RTO: 0 /100 WBCS (ref 0–0)
PLATELET # BLD AUTO: 323 X10*3/UL (ref 150–450)
POTASSIUM SERPL-SCNC: 3.9 MMOL/L (ref 3.5–5.3)
PROT SERPL-MCNC: 6.7 G/DL (ref 6.4–8.2)
RBC # BLD AUTO: 4.19 X10*6/UL (ref 4.5–5.9)
SODIUM SERPL-SCNC: 144 MMOL/L (ref 136–145)
WBC # BLD AUTO: 6.6 X10*3/UL (ref 4.4–11.3)

## 2024-04-25 PROCEDURE — 93971 EXTREMITY STUDY: CPT

## 2024-04-25 PROCEDURE — 73610 X-RAY EXAM OF ANKLE: CPT | Mod: LT

## 2024-04-25 PROCEDURE — 3078F DIAST BP <80 MM HG: CPT | Performed by: INTERNAL MEDICINE

## 2024-04-25 PROCEDURE — 1160F RVW MEDS BY RX/DR IN RCRD: CPT | Performed by: INTERNAL MEDICINE

## 2024-04-25 PROCEDURE — 80053 COMPREHEN METABOLIC PANEL: CPT

## 2024-04-25 PROCEDURE — 99214 OFFICE O/P EST MOD 30 MIN: CPT | Performed by: INTERNAL MEDICINE

## 2024-04-25 PROCEDURE — 3075F SYST BP GE 130 - 139MM HG: CPT | Performed by: INTERNAL MEDICINE

## 2024-04-25 PROCEDURE — 85025 COMPLETE CBC W/AUTO DIFF WBC: CPT

## 2024-04-25 PROCEDURE — 73610 X-RAY EXAM OF ANKLE: CPT | Mod: LEFT SIDE | Performed by: RADIOLOGY

## 2024-04-25 PROCEDURE — 1159F MED LIST DOCD IN RCRD: CPT | Performed by: INTERNAL MEDICINE

## 2024-04-25 PROCEDURE — 36415 COLL VENOUS BLD VENIPUNCTURE: CPT

## 2024-04-25 RX ORDER — COLCHICINE 0.6 MG/1
0.6 TABLET ORAL 2 TIMES DAILY
Qty: 60 TABLET | Refills: 0 | Status: SHIPPED | OUTPATIENT
Start: 2024-04-25 | End: 2024-05-25

## 2024-04-25 RX ORDER — INDOMETHACIN 25 MG/1
25 CAPSULE ORAL 3 TIMES DAILY PRN
Qty: 30 CAPSULE | Refills: 1 | Status: CANCELLED | OUTPATIENT
Start: 2024-04-25 | End: 2025-04-25

## 2024-04-25 ASSESSMENT — ENCOUNTER SYMPTOMS
DEPRESSION: 0
OCCASIONAL FEELINGS OF UNSTEADINESS: 0
LOSS OF SENSATION IN FEET: 0

## 2024-04-26 NOTE — PROGRESS NOTES
"Subjective   Patient ID: Duane O Reid is a 68 y.o. male who presents for Follow-up (various conditions).    This gentleman today came here for pain and swelling in the left foot and ankle for no good reason under his little toe, it happened suddenly yesterday.  He does not recall fall, trauma or injury.  No need for stress fracture.  He does not think he has gout.  Appetite and weight are okay.  No problem.  He came here for follow-up on various conditions.    I have personally reviewed the patient's Past Medical History, Medications, Allergies, Social History, and Family History in the EMR.    Review of Systems   All other systems reviewed and are negative.    Objective   /74   Ht 1.676 m (5' 6\")   Wt 86.2 kg (190 lb)   BMI 30.67 kg/m²     Physical Exam  Vitals reviewed.   Cardiovascular:      Heart sounds: Normal heart sounds, S1 normal and S2 normal. No murmur heard.     No friction rub.   Pulmonary:      Effort: Pulmonary effort is normal.      Breath sounds: Normal breath sounds and air entry.   Abdominal:      Palpations: There is no hepatomegaly, splenomegaly or mass.   Musculoskeletal:      Right lower leg: No edema.      Left lower leg: No edema.      Comments: Left foot little toe swelling and tenderness present.  Dorsalis pedis okay.   Lymphadenopathy:      Lower Body: No right inguinal adenopathy. No left inguinal adenopathy.   Neurological:      Cranial Nerves: Cranial nerves 2-12 are intact.      Sensory: No sensory deficit.      Motor: Motor function is intact.      Deep Tendon Reflexes: Reflexes are normal and symmetric.     LAB WORK:  Laboratory testing discussed.    Assessment/Plan   Problem List Items Addressed This Visit             ICD-10-CM       Cardiac and Vasculature    Benign essential hypertension I10    Relevant Orders    CBC and Auto Differential (Completed)    Hyperlipidemia E78.5    Relevant Orders    Comprehensive Metabolic Panel (Completed)       Symptoms and Signs    Leg " swelling M79.89    Relevant Orders    Lower extremity venous duplex left (Completed)     Other Visit Diagnoses         Codes    Deep vein thrombosis (DVT) of non-extremity vein, unspecified chronicity     I82.90    Relevant Orders    Lower extremity venous duplex left (Completed)    Gout, unspecified cause, unspecified chronicity, unspecified site     M10.9    Relevant Medications    colchicine 0.6 mg tablet    Left ankle pain, unspecified chronicity     M25.572        1. Left foot and ankle pain and swelling, it looks like a gout.  I do not think it is stress fracture.  I ordered x-ray in anyway.  Blood work ______ colchicine and indocin given.  I explained the pathophysiology.  Monitor.  2. Follow up in a week, but if situation gets worse he will call me, probably put him on steroids.  3. I shall see him back on Monday or next visit.  He will call me anytime if no improvement.    Scribe Attestation  By signing my name below, IMeghan Scribe attest that this documentation has been prepared under the direction and in the presence of Monica Parnell MD.

## 2024-05-22 ENCOUNTER — OFFICE VISIT (OUTPATIENT)
Dept: UROLOGY | Facility: HOSPITAL | Age: 69
End: 2024-05-22
Payer: COMMERCIAL

## 2024-05-22 DIAGNOSIS — N40.0 BENIGN PROSTATIC HYPERPLASIA, UNSPECIFIED WHETHER LOWER URINARY TRACT SYMPTOMS PRESENT: Primary | ICD-10-CM

## 2024-05-22 DIAGNOSIS — Z12.5 SCREENING PSA (PROSTATE SPECIFIC ANTIGEN): ICD-10-CM

## 2024-05-22 PROCEDURE — 1160F RVW MEDS BY RX/DR IN RCRD: CPT | Performed by: UROLOGY

## 2024-05-22 PROCEDURE — 51798 US URINE CAPACITY MEASURE: CPT | Performed by: UROLOGY

## 2024-05-22 PROCEDURE — 99213 OFFICE O/P EST LOW 20 MIN: CPT | Performed by: UROLOGY

## 2024-05-22 PROCEDURE — 1159F MED LIST DOCD IN RCRD: CPT | Performed by: UROLOGY

## 2024-05-22 NOTE — PROGRESS NOTES
HPI    68 y.o. male being seen with the following problem list:    Problem list:  BPH and poorly controlled LUTS s/p outpatient HolEP + 100U botox. Path 16g, benign.      2/1/24 - seen for TOV, 200cc in, 225cc out. Urine clear, no issues         2/21/24 - seen for PVR. PVR 44cc. Patient reports an improved stream, improved nocturia. He has no leakage.    05/22/24 - PVR 3cc.  Stream is strong.  No leakage.  No urgency or frequency.  Caging of some spraying of the stream, but for the most part doing well.  His brother was diagnosed with prostate cancer.  Overall fairly happy with his outcome    Lab Results   Component Value Date    PSA 3.43 05/02/2022    PSA 3.9 12/02/2021    PSA 4.0 09/09/2021         Current Medications:  Current Outpatient Medications   Medication Sig Dispense Refill    acetaminophen (Tylenol 8 HOUR) 650 mg ER tablet Take 1 tablet (650 mg) by mouth every 8 hours if needed for mild pain (1 - 3). Do not crush, chew, or split.      colchicine 0.6 mg tablet Take 1 tablet (0.6 mg) by mouth 2 times a day. 60 tablet 0    docusate sodium (Colace) 100 mg capsule Take 1 capsule (100 mg) by mouth 2 times a day. Hold for loose stools 30 capsule 0    folic acid/multivit-min/lutein (CENTRUM SILVER ORAL) Take 1 tablet by mouth once daily.      loratadine (Claritin) 10 mg tablet Take 1 tablet (10 mg) by mouth once daily as needed.      mv-min/folic/K1/lycopen/lutein (CENTRUM SILVER MEN ORAL) Take 1 tablet by mouth once daily.      pantoprazole (ProtoNix) 40 mg EC tablet Take 1 tablet (40 mg) by mouth once daily. Do not crush, chew, or split. 90 tablet 3     No current facility-administered medications for this visit.        Active Problems:  Duane O Reid is a 68 y.o. male with the following Problems and Medications.  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)    Adverse effect of radiation    Allergic conjunctivitis    Allergic reaction    Allergic  rhinitis    Anemia    Arcus senilis of eye    Benign essential hypertension    Bilateral impacted cerumen    Blurred vision, bilateral    Carpal tunnel syndrome    Cataracts, both eyes    Chest pain    Chronic eczematous otitis externa of both ears    Dizziness    Dry ear canal    BPH (benign prostatic hyperplasia)    Enlarged prostate with lower urinary tract symptoms (LUTS)    Enlarged prostate without lower urinary tract symptoms (luts)    Epiretinal membrane (ERM) of left eye    Erectile dysfunction    Hearing loss    Herniated nucleus pulposus, L5-S1, left    Esophageal reflux    Hiatal hernia    Hypothyroidism    Hyperlipidemia    Leg swelling    Lower back pain    Lower urinary tract symptoms (LUTS)    Lump of ear    Mass of parotid gland    Nocturia    Nuclear sclerosis    Overactive bladder    Prostatitis    Lung nodule, multiple    Pulmonary nodule    PVD (posterior vitreous detachment), both eyes    Refractive error    Scalp cyst    Sinusitis    Vitamin D deficiency    Vitreous floaters    Wears eyeglasses    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 Do not crush, chew, or split.      colchicine 0.6 mg tablet Take 1 tablet (0.6 mg) by mouth 2 times a day. 60 tablet 0    docusate sodium (Colace) 100 mg capsule Take 1 capsule (100 mg) by mouth 2 times a day. Hold for loose stools 30 capsule 0    folic acid/multivit-min/lutein (CENTRUM SILVER ORAL) Take 1 tablet by mouth once daily.      loratadine (Claritin) 10 mg tablet Take 1 tablet (10 mg) by mouth once daily as needed.      mv-min/folic/K1/lycopen/lutein (CENTRUM SILVER MEN ORAL) Take 1 tablet by mouth once  daily.      pantoprazole (ProtoNix) 40 mg EC tablet Take 1 tablet (40 mg) by mouth once daily. Do not crush, chew, or split. 90 tablet 3     No current facility-administered medications for this visit.       PMH:  Past Medical History:   Diagnosis Date    Anemia 2023    H/H .1    BPH (benign prostatic hyperplasia)     w/ LUTS    Chronic headaches     Facial paralysis/[place] palsy     resolved    GERD (gastroesophageal reflux disease)     Head and neck cancer (Multi) 2018    Adenoid cyst carcinoma, s/p resection x 2, chemo and radiation    Hearing aid worn     bilateral    HL (hearing loss)     Lumbar disc herniation     L5-S1    Osteoarthritis     Other vitreous opacities, unspecified eye     Vitreous floaters    Pure hypercholesterolemia, unspecified 2013    Low-density-lipoid-type (LDL) hyperlipoproteinemia    Sickle cell trait (CMS-HCC)     Sleep apnea     uses CPAP intermittently-doesn't like to use       PSH:  Past Surgical History:   Procedure Laterality Date    HEMORRHOID SURGERY  1980    KNEE ARTHROPLASTY Right 2018    SALIVARY GLAND SURGERY  2018    resection    SALIVARY GLAND SURGERY  2020    revision    TOTAL KNEE ARTHROPLASTY Left        FMH:  Family History   Problem Relation Name Age of Onset    Eczema Mother          age 98    Diabetes type II Father           age 96    Arthritis Sister      Diabetes Brother      Addiction problem Half-Sister          overdose    No Known Problems Half-Sister         SHx:  Social History     Tobacco Use    Smoking status: Former     Current packs/day: 0.00     Average packs/day: 0.5 packs/day for 8.0 years (4.0 ttl pk-yrs)     Types: Cigarettes     Start date:      Quit date:      Years since quittin.4     Passive exposure: Never    Smokeless tobacco: Never   Substance Use Topics    Alcohol use: Yes     Comment: RARELY-1-2 drinks a month    Drug use: Not Currently     Types: Marijuana     Comment: quit         Allergies:  Allergies   Allergen Reactions    Naproxen Other     Blood in stool    Niacin Other     Patient doesn't remember         Assessment/Plan  About 3 months out, doing well.  Stream is strong.  Emptying well.  No leakage.  No bothersome urinary symptoms other than occasional splaying of the stream.  Discussed that this may or may not be fixable, but given that he is doing well Would not be inclined to intervene unless he is very bothered.    Will give this more time to heal.  Will discuss in 3 months or telehealth.  Will get a PSA at that time given his family history of prostate cancer.        Scribe Attestation  By signing my name below, I, Litzy Martinez, attest that this documentation  has been prepared under the direction and in the presence of Miller Zavala MD.

## 2024-06-25 ENCOUNTER — APPOINTMENT (OUTPATIENT)
Dept: PRIMARY CARE | Facility: CLINIC | Age: 69
End: 2024-06-25
Payer: COMMERCIAL

## 2024-06-26 ENCOUNTER — HOSPITAL ENCOUNTER (OUTPATIENT)
Dept: RADIOLOGY | Facility: CLINIC | Age: 69
Discharge: HOME | End: 2024-06-26
Payer: COMMERCIAL

## 2024-06-26 ENCOUNTER — LAB (OUTPATIENT)
Dept: LAB | Facility: LAB | Age: 69
End: 2024-06-26
Payer: COMMERCIAL

## 2024-06-26 ENCOUNTER — OFFICE VISIT (OUTPATIENT)
Dept: PRIMARY CARE | Facility: CLINIC | Age: 69
End: 2024-06-26
Payer: COMMERCIAL

## 2024-06-26 ENCOUNTER — APPOINTMENT (OUTPATIENT)
Dept: PRIMARY CARE | Facility: CLINIC | Age: 69
End: 2024-06-26
Payer: COMMERCIAL

## 2024-06-26 VITALS
WEIGHT: 186 LBS | SYSTOLIC BLOOD PRESSURE: 136 MMHG | HEIGHT: 66 IN | BODY MASS INDEX: 29.89 KG/M2 | DIASTOLIC BLOOD PRESSURE: 74 MMHG

## 2024-06-26 DIAGNOSIS — E29.1 HYPOGONADISM IN MALE: ICD-10-CM

## 2024-06-26 DIAGNOSIS — N40.1 BENIGN PROSTATIC HYPERPLASIA WITH LOWER URINARY TRACT SYMPTOMS, SYMPTOM DETAILS UNSPECIFIED: ICD-10-CM

## 2024-06-26 DIAGNOSIS — E03.9 HYPOTHYROIDISM, UNSPECIFIED TYPE: ICD-10-CM

## 2024-06-26 DIAGNOSIS — G89.29 CHRONIC LEFT SHOULDER PAIN: ICD-10-CM

## 2024-06-26 DIAGNOSIS — M25.562 LEFT KNEE PAIN, UNSPECIFIED CHRONICITY: ICD-10-CM

## 2024-06-26 DIAGNOSIS — M25.512 CHRONIC LEFT SHOULDER PAIN: ICD-10-CM

## 2024-06-26 DIAGNOSIS — M54.2 NECK PAIN: ICD-10-CM

## 2024-06-26 DIAGNOSIS — I10 BENIGN ESSENTIAL HYPERTENSION: ICD-10-CM

## 2024-06-26 DIAGNOSIS — Z12.5 PROSTATE CANCER SCREENING: ICD-10-CM

## 2024-06-26 DIAGNOSIS — E78.5 HYPERLIPIDEMIA, UNSPECIFIED HYPERLIPIDEMIA TYPE: ICD-10-CM

## 2024-06-26 DIAGNOSIS — R73.03 PRE-DIABETES: ICD-10-CM

## 2024-06-26 DIAGNOSIS — M54.10 RADICULOPATHY, UNSPECIFIED SPINAL REGION: Primary | ICD-10-CM

## 2024-06-26 DIAGNOSIS — E53.9 VITAMIN B DEFICIENCY: ICD-10-CM
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
## 2024-06-26 PROBLEM — H49.20 ABDUCENS NERVE PALSY: Status: ACTIVE | Noted: 2024-06-26

## 2024-06-26 PROBLEM — N39.0 URINARY TRACT INFECTION: Status: ACTIVE | Noted: 2024-06-26

## 2024-06-26 PROBLEM — R51.9 HEADACHE: Status: ACTIVE | Noted: 2024-06-26

## 2024-06-26 PROBLEM — E78.00 FAMILIAL HYPERLIPOPROTEINEMIA TYPE IIA: Status: ACTIVE | Noted: 2023-05-30

## 2024-06-26 PROBLEM — U07.1 DISEASE DUE TO SEVERE ACUTE RESPIRATORY SYNDROME CORONAVIRUS 2 (SARS-COV-2): Status: ACTIVE | Noted: 2024-06-26

## 2024-06-26 PROBLEM — M19.90 ARTHRITIS: Status: ACTIVE | Noted: 2023-09-01

## 2024-06-26 PROBLEM — Z86.69 HISTORY OF HEARING LOSS: Status: ACTIVE | Noted: 2024-06-26

## 2024-06-26 PROBLEM — M89.9 DISORDER OF BONE: Status: ACTIVE | Noted: 2023-11-03

## 2024-06-26 LAB
ALBUMIN SERPL BCP-MCNC: 4.3 G/DL (ref 3.4–5)
ALP SERPL-CCNC: 50 U/L (ref 33–136)
ALT SERPL W P-5'-P-CCNC: 17 U/L (ref 10–52)
ANION GAP SERPL CALC-SCNC: 11 MMOL/L (ref 10–20)
AST SERPL W P-5'-P-CCNC: 16 U/L (ref 9–39)
BILIRUB SERPL-MCNC: 1.1 MG/DL (ref 0–1.2)
BUN SERPL-MCNC: 11 MG/DL (ref 6–23)
CALCIUM SERPL-MCNC: 9.7 MG/DL (ref 8.6–10.6)
CHLORIDE SERPL-SCNC: 106 MMOL/L (ref 98–107)
CHOLEST SERPL-MCNC: 233 MG/DL (ref 0–199)
CHOLESTEROL/HDL RATIO: 3.3
CO2 SERPL-SCNC: 29 MMOL/L (ref 21–32)
CREAT SERPL-MCNC: 0.81 MG/DL (ref 0.5–1.3)
EGFRCR SERPLBLD CKD-EPI 2021: >90 ML/MIN/1.73M*2
ERYTHROCYTE [DISTWIDTH] IN BLOOD BY AUTOMATED COUNT: 13 % (ref 11.5–14.5)
EST. AVERAGE GLUCOSE BLD GHB EST-MCNC: 94 MG/DL
GLUCOSE SERPL-MCNC: 98 MG/DL (ref 74–99)
HBA1C MFR BLD: 4.9 %
HCT VFR BLD AUTO: 37.5 % (ref 41–52)
HDLC SERPL-MCNC: 70.1 MG/DL
HGB BLD-MCNC: 12.1 G/DL (ref 13.5–17.5)
LDLC SERPL CALC-MCNC: 141 MG/DL
MCH RBC QN AUTO: 28.8 PG (ref 26–34)
MCHC RBC AUTO-ENTMCNC: 32.3 G/DL (ref 32–36)
MCV RBC AUTO: 89 FL (ref 80–100)
NON HDL CHOLESTEROL: 163 MG/DL (ref 0–149)
NRBC BLD-RTO: 0 /100 WBCS (ref 0–0)
PLATELET # BLD AUTO: 313 X10*3/UL (ref 150–450)
POTASSIUM SERPL-SCNC: 4 MMOL/L (ref 3.5–5.3)
PROT SERPL-MCNC: 6.7 G/DL (ref 6.4–8.2)
RBC # BLD AUTO: 4.2 X10*6/UL (ref 4.5–5.9)
SODIUM SERPL-SCNC: 142 MMOL/L (ref 136–145)
TRIGL SERPL-MCNC: 112 MG/DL (ref 0–149)
TSH SERPL-ACNC: 0.75 MIU/L (ref 0.44–3.98)
VIT B12 SERPL-MCNC: 517 PG/ML (ref 211–911)
VLDL: 22 MG/DL (ref 0–40)
WBC # BLD AUTO: 6.3 X10*3/UL (ref 4.4–11.3)

## 2024-06-26 PROCEDURE — 73030 X-RAY EXAM OF SHOULDER: CPT | Mod: LT

## 2024-06-26 PROCEDURE — 99214 OFFICE O/P EST MOD 30 MIN: CPT | Performed by: INTERNAL MEDICINE

## 2024-06-26 PROCEDURE — 3075F SYST BP GE 130 - 139MM HG: CPT | Performed by: INTERNAL MEDICINE

## 2024-06-26 PROCEDURE — 84443 ASSAY THYROID STIM HORMONE: CPT

## 2024-06-26 PROCEDURE — 72050 X-RAY EXAM NECK SPINE 4/5VWS: CPT | Performed by: RADIOLOGY

## 2024-06-26 PROCEDURE — 3078F DIAST BP <80 MM HG: CPT | Performed by: INTERNAL MEDICINE

## 2024-06-26 PROCEDURE — 80053 COMPREHEN METABOLIC PANEL: CPT

## 2024-06-26 PROCEDURE — 84402 ASSAY OF FREE TESTOSTERONE: CPT

## 2024-06-26 PROCEDURE — 73030 X-RAY EXAM OF SHOULDER: CPT | Mod: LEFT SIDE | Performed by: RADIOLOGY

## 2024-06-26 PROCEDURE — 73562 X-RAY EXAM OF KNEE 3: CPT | Mod: LEFT SIDE | Performed by: RADIOLOGY

## 2024-06-26 PROCEDURE — 73562 X-RAY EXAM OF KNEE 3: CPT | Mod: LT

## 2024-06-26 PROCEDURE — 36415 COLL VENOUS BLD VENIPUNCTURE: CPT

## 2024-06-26 PROCEDURE — 72050 X-RAY EXAM NECK SPINE 4/5VWS: CPT

## 2024-06-26 PROCEDURE — 82607 VITAMIN B-12: CPT

## 2024-06-26 PROCEDURE — 80061 LIPID PANEL: CPT

## 2024-06-26 PROCEDURE — 83036 HEMOGLOBIN GLYCOSYLATED A1C: CPT

## 2024-06-26 PROCEDURE — 85027 COMPLETE CBC AUTOMATED: CPT

## 2024-06-26 RX ORDER — METHOCARBAMOL 500 MG/1
500 TABLET, FILM COATED ORAL 4 TIMES DAILY PRN
Qty: 40 TABLET | Refills: 0 | Status: SHIPPED | OUTPATIENT
Start: 2024-06-26 | End: 2024-08-25

## 2024-06-26 RX ORDER — IBUPROFEN 600 MG/1
600 TABLET ORAL 4 TIMES DAILY PRN
Qty: 90 TABLET | Refills: 5 | Status: SHIPPED | OUTPATIENT
Start: 2024-06-26 | End: 2025-06-26

## 2024-06-26 NOTE — PROGRESS NOTES
"Subjective   Patient ID: Duane O Reid is a 68 y.o. male who presents for Fall, Knee Pain, and Shoulder Pain.    Duane Reid today came here for multiple medical issues.  1. Severe neck pain and left arm pain.  He has left shoulder pain and knee pain.  This is all happened because he fell down at home.  No ligation involved.  It happened five days ago.  He did not hit his head, did not pass out.  Severe pain, not feeling better.  He can take ibuprofen.  It did not help much.  He is concerned with that.  2. Follow-up on blood work and other conditions.  He sees prostate specialist.  Appetite and weight are okay.  No problem.  3. Knee movements painful.  Left knee swelling and tenderness.  Left shoulder overhead movements painful.  Left shoulder problem is old, but it is got aggravated.    I have personally reviewed the patient's Past Medical History, Medications, Allergies, Social History, and Family History in the EMR.    Fall    Knee Pain     Shoulder Pain     Review of Systems   All other systems reviewed and are negative.    Objective   /74   Ht 1.676 m (5' 6\")   Wt 84.4 kg (186 lb)   BMI 30.02 kg/m²     Physical Exam  Vitals reviewed.   Neck:      Comments: Diffuse tenderness.  Movements painful.  Cardiovascular:      Heart sounds: Normal heart sounds, S1 normal and S2 normal. No murmur heard.     No friction rub.   Pulmonary:      Effort: Pulmonary effort is normal.      Breath sounds: Normal breath sounds and air entry.   Abdominal:      Palpations: There is no hepatomegaly, splenomegaly or mass.   Musculoskeletal:      Right lower leg: No edema.      Left lower leg: No edema.      Comments: Left shoulder movements painful.   Lymphadenopathy:      Lower Body: No right inguinal adenopathy. No left inguinal adenopathy.   Neurological:      Cranial Nerves: Cranial nerves 2-12 are intact.      Sensory: No sensory deficit.      Motor: Motor function is intact.      Deep Tendon Reflexes: Reflexes are normal " and symmetric.     LAB WORK:  Laboratory testing discussed.    Assessment/Plan   Problem List Items Addressed This Visit             ICD-10-CM       Cardiac and Vasculature    Benign essential hypertension I10    Relevant Orders    CBC    Hyperlipidemia E78.5    Relevant Orders    Comprehensive Metabolic Panel    Lipid Panel       Endocrine/Metabolic    Hypothyroidism E03.9    Relevant Orders    Thyroid Stimulating Hormone    Vitamin B deficiency E53.9    Relevant Orders    Vitamin B12       Genitourinary and Reproductive    Enlarged prostate with lower urinary tract symptoms (LUTS) N40.1    Relevant Orders    Testosterone,Free and Total     Other Visit Diagnoses         Codes    Radiculopathy, unspecified spinal region    -  Primary M54.10    Pre-diabetes     R73.03    Relevant Orders    Hemoglobin A1C    Neck pain     M54.2    Relevant Orders    XR cervical spine complete 4-5 views (Completed)    Referral to Physical Therapy    Chronic left shoulder pain     M25.512, G89.29    Relevant Orders    XR shoulder left 2+ views (Completed)    Referral to Physical Therapy    Left knee pain, unspecified chronicity     M25.562    Relevant Orders    Referral to Physical Therapy    Fall, initial encounter     W19.XXXA    Hypogonadism in male     E29.1    Prostate cancer screening     Z12.5        1. Neck pain with radiculopathy from fall.  X-ray ordered.  Therapy.  2. Left knee pain.  He is using brace.  He is okay.  X-ray ordered.  He had a surgery.  3. Left shoulder pain.  It is old problem, it got aggravated.  X-ray ordered.  Monitor.  4. He told me he can take ibuprofen, but not ______ given.  Robaxin given.  Muscle relaxant. Physical therapy.  5. History of hypogonadism.  Testosterone ordered.  6. Hypertension, okay.  7. High cholesterol, stable.  Cholesterol check ordered.  8. Prostate.  He sees a specialist.  9. I shall see him back in a couple of weeks for Medicare Wellness and follow-up on this.    Scribe  Attestation  By signing my name below, IMeghan Scribe attest that this documentation has been prepared under the direction and in the presence of Monica Parnell MD.

## 2024-07-03 LAB
TESTOSTERONE FREE (CHAN): 30.4 PG/ML (ref 30–135)
TESTOSTERONE,TOTAL,LC-MS/MS: 249 NG/DL (ref 250–1100)

## 2024-07-13 ENCOUNTER — TELEPHONE (OUTPATIENT)
Dept: PRIMARY CARE | Facility: CLINIC | Age: 69
End: 2024-07-13
Payer: COMMERCIAL

## 2024-07-19 ENCOUNTER — OFFICE VISIT (OUTPATIENT)
Dept: PRIMARY CARE | Facility: CLINIC | Age: 69
End: 2024-07-19
Payer: COMMERCIAL

## 2024-07-19 VITALS
WEIGHT: 189 LBS | DIASTOLIC BLOOD PRESSURE: 68 MMHG | BODY MASS INDEX: 30.37 KG/M2 | SYSTOLIC BLOOD PRESSURE: 122 MMHG | HEIGHT: 66 IN

## 2024-07-19 DIAGNOSIS — Z00.00 MEDICARE ANNUAL WELLNESS VISIT, INITIAL: Primary | ICD-10-CM

## 2024-07-19 DIAGNOSIS — E78.5 HYPERLIPIDEMIA, UNSPECIFIED HYPERLIPIDEMIA TYPE: ICD-10-CM

## 2024-07-19 DIAGNOSIS — K21.9 GASTROESOPHAGEAL REFLUX DISEASE, UNSPECIFIED WHETHER ESOPHAGITIS PRESENT: ICD-10-CM

## 2024-07-19 DIAGNOSIS — E03.9 HYPOTHYROIDISM, UNSPECIFIED TYPE: ICD-10-CM

## 2024-07-19 DIAGNOSIS — I10 BENIGN ESSENTIAL HYPERTENSION: ICD-10-CM

## 2024-07-19 DIAGNOSIS — T78.40XA ALLERGY, INITIAL ENCOUNTER: ICD-10-CM

## 2024-07-19 PROCEDURE — 99213 OFFICE O/P EST LOW 20 MIN: CPT | Performed by: INTERNAL MEDICINE

## 2024-07-19 PROCEDURE — 1170F FXNL STATUS ASSESSED: CPT | Performed by: INTERNAL MEDICINE

## 2024-07-19 PROCEDURE — G0439 PPPS, SUBSEQ VISIT: HCPCS | Performed by: INTERNAL MEDICINE

## 2024-07-19 PROCEDURE — 3008F BODY MASS INDEX DOCD: CPT | Performed by: INTERNAL MEDICINE

## 2024-07-19 PROCEDURE — 1157F ADVNC CARE PLAN IN RCRD: CPT | Performed by: INTERNAL MEDICINE

## 2024-07-19 PROCEDURE — 1160F RVW MEDS BY RX/DR IN RCRD: CPT | Performed by: INTERNAL MEDICINE

## 2024-07-19 PROCEDURE — 3078F DIAST BP <80 MM HG: CPT | Performed by: INTERNAL MEDICINE

## 2024-07-19 PROCEDURE — 3074F SYST BP LT 130 MM HG: CPT | Performed by: INTERNAL MEDICINE

## 2024-07-19 PROCEDURE — 1159F MED LIST DOCD IN RCRD: CPT | Performed by: INTERNAL MEDICINE

## 2024-07-19 RX ORDER — FEXOFENADINE HCL 60 MG/1
60 TABLET, FILM COATED ORAL DAILY
COMMUNITY

## 2024-07-19 ASSESSMENT — PATIENT HEALTH QUESTIONNAIRE - PHQ9
1. LITTLE INTEREST OR PLEASURE IN DOING THINGS: NOT AT ALL
2. FEELING DOWN, DEPRESSED OR HOPELESS: NOT AT ALL
SUM OF ALL RESPONSES TO PHQ9 QUESTIONS 1 AND 2: 0

## 2024-07-19 ASSESSMENT — ACTIVITIES OF DAILY LIVING (ADL)
DRESSING: INDEPENDENT
BATHING: INDEPENDENT

## 2024-07-19 ASSESSMENT — ENCOUNTER SYMPTOMS
LOSS OF SENSATION IN FEET: 0
DEPRESSION: 0
OCCASIONAL FEELINGS OF UNSTEADINESS: 0

## 2024-07-20 NOTE — PROGRESS NOTES
"Subjective   Patient ID: Duane O Reid is a 68 y.o. male who presents for Medicare Annual Wellness Visit Initial.    Mr. Duane Reid today came here for:  1. Medicare Wellness Visit.  2. Follow-up on blood work and other conditions.  He is feeling very tired, fatigued, exhausted.  He is trying to use CPAP machine, okay.  No problem.    IMMUNIZATION: Tetanus within the last 10 years.  All shots ______.  He does not want pneumonia shot.    I have personally reviewed the patient's Past Medical History, Medications, Allergies, Social History, and Family History in the EMR.    Review of Systems   All other systems reviewed and are negative.  The patient never had a heart attack.  No stroke.  No diabetes.  No cancer.  He has a sleep apnea machine.  He wears hearing aid.     Objective   /68   Ht 1.676 m (5' 6\")   Wt 85.7 kg (189 lb)   BMI 30.51 kg/m²     Physical Exam  Vitals reviewed.   HENT:      Right Ear: Tympanic membrane, ear canal and external ear normal.      Left Ear: Tympanic membrane, ear canal and external ear normal.   Eyes:      General: No scleral icterus.     Pupils: Pupils are equal, round, and reactive to light.   Neck:      Vascular: No carotid bruit.   Cardiovascular:      Heart sounds: Normal heart sounds, S1 normal and S2 normal. No murmur heard.     No friction rub.   Pulmonary:      Effort: Pulmonary effort is normal.      Breath sounds: Normal breath sounds and air entry.   Abdominal:      Palpations: There is no hepatomegaly, splenomegaly or mass.   Genitourinary:     Prostate: Normal.   Musculoskeletal:         General: No swelling or deformity. Normal range of motion.      Cervical back: Neck supple.      Right lower leg: No edema.      Left lower leg: No edema.   Lymphadenopathy:      Cervical: No cervical adenopathy.      Upper Body:      Right upper body: No axillary adenopathy.      Left upper body: No axillary adenopathy.      Lower Body: No right inguinal adenopathy. No left " inguinal adenopathy.   Neurological:      Mental Status: He is oriented to person, place, and time.      Cranial Nerves: Cranial nerves 2-12 are intact. No cranial nerve deficit.      Sensory: No sensory deficit.      Motor: Motor function is intact. No weakness.      Gait: Gait is intact.      Deep Tendon Reflexes: Reflexes normal.   Psychiatric:         Mood and Affect: Mood normal. Mood is not anxious or depressed. Affect is not angry.         Behavior: Behavior is not agitated.         Thought Content: Thought content normal.         Judgment: Judgment normal.     LAB WORK: Laboratory testing discussed.    Assessment/Plan   Problem List Items Addressed This Visit             ICD-10-CM       Cardiac and Vasculature    Benign essential hypertension I10    Relevant Orders    CBC    Urinalysis with Reflex Microscopic    Hyperlipidemia E78.5    Relevant Orders    Comprehensive Metabolic Panel    Lipid Panel       Endocrine/Metabolic    Hypothyroidism E03.9    Relevant Orders    Thyroid Stimulating Hormone       Gastrointestinal and Abdominal    Esophageal reflux K21.9     Other Visit Diagnoses         Codes    Medicare annual wellness visit, initial    -  Primary Z00.00    Allergy, initial encounter     T78.40XA        1. Medicare Wellness Visit.  2. The patient is full code.  Wife and son are legal power of .  3. The patient is not depressed, not suicidal.  4. GERD, on PPI.  5. Allergies, stable.  6. Cholesterol.  Monitor.  Diet, exercise.  7. Low testosterone level.  He could be a candidate for testosterone replacement, but his prostate is enlarged.  He has a prostate surgeon, he will ask him.  8. Blood pressure, okay.  9. Follow-up appointment with me in a couple of months with repeat tests.  I thanked him to participate in Medicare Wellness Visit.  He will think about his pneumonia shot.    Scribe Attestation  By signing my name below, I, Meghan Solis, Scribe attest that this documentation has been prepared  under the direction and in the presence of Monica Parnell MD.

## 2024-07-26 ASSESSMENT — ENCOUNTER SYMPTOMS
UNEXPECTED WEIGHT CHANGE: 0
NAUSEA: 0
NECK PAIN: 1
SHORTNESS OF BREATH: 0
STRIDOR: 0
ADENOPATHY: 0
FACIAL SWELLING: 0
FATIGUE: 0
VOICE CHANGE: 0
SORE THROAT: 0
CHILLS: 0
TROUBLE SWALLOWING: 0
FEVER: 0
COUGH: 0
VOMITING: 0
APPETITE CHANGE: 0

## 2024-07-26 NOTE — PROGRESS NOTES
Cancer follow up  TSH: Due 1/25   Chest: Due 4/24    Chief Complaint   Patient presents with    Follow-up     Right ear follow up.     HPI:  Duane O Reid is a 68 y.o. male following up with me today for his recurrent right parotid adenoid cystic carcinoma. Last seen 1/2024. Patient is s/p revision right skin nodules, right inferior parotidectomy without dissection of the facial nerve, excision of right neck skin on 11/6/20. Completed adjuvant proton radiation with Dr. Acharya on 2/15/21. Underwent Holmium Laser Transurethral Enucleation Prostate and Cystoscopy with Botox Injection (Bladder) on 1/30/24. I personally reviewed results of neck US 2/24.  No evidence of recurrence of parotid nodules or lymphadenopathy.  Previously seen subcentimeter thyroid nodules on CT scans.  Therefore thyroid US was recommended.  Due to get repeat US in August.  Getting new hearing aids, requesting to get ears checked.     History:  Dx: Right parotid adenoid cystic carcinoma 2018  Dx#2: Recurrent right parotid adenoid cystic carcinoma 2020 6/12/18 CT neck with contrast: negative for visible mass within the right parotid, no pathologic lymphadenopathy.   6/22/18 MRI +[id number] nodule within the right parotid. This is well encapsulated.  7/25/18: US guided FNA +adenoid cystic carcinoma   8/31/18: S/p right total parotidectomy. Final path +adenoid cystic carcinoma, near margin  3/4/19: MRI enhancement within the right parotid bed but no evidence of discrete parotid mass  6/26/19: No clinical evidence of recurrence  9/11/19: MRI decreased enhancement of the right parotid bed, no recurrence, no FN enhancement  9/8/20: MRI decreased enhancement within the right parotid bed. There are two small enhancing nodules within the right neck subcutaneous fat which have increased in size compared to prior MRI.   9/9/20: FNA of R neck mass. Path + adenoid cystic carcinoma   10/14/20: CT chest w/o contrast stable 4mm right lung nodule.   11/6/20: S/p  revision right parotidectomy without dissection of the facial nerve, excision of right neck skin. Path revealed recurrent adenoid cystic carcinoma. This measured 1.2cm in size. There was +perineural involvement. The skin over the area where the nodule was located was resected and there was adenoid cystic carcinoma found within the skin with the perineural involvement.  11/20/20: Tumor board recommendation adjuvant radiation   2/15/21: Scheduled to finish radiation with Dr. Acharya   5/13/21: 3 month post treatment PET negative.  7/21: +lymphedema, mild  5/2/22: TSH normal  5/16/22: CT neck and chest w/o contrast- negative   1/23: TSH normal  5/23: CT neck and chest negative for recurrence or new masses.  9/23: TSH normal  2/24: Neck pain/swelling with traveling, improved since he's been home  7/31: Stable right trismus (since 2020)     SH:  Tob: 3/4ppd, quit 1990s, h/o marijuana quit 1994  ETOH: 4x/week    ROS:  Review of Systems   Constitutional:  Negative for appetite change, chills, fatigue, fever and unexpected weight change.   HENT:  Negative for dental problem, drooling, ear pain, facial swelling, hearing loss, mouth sores, sore throat, tinnitus, trouble swallowing and voice change.    Respiratory:  Negative for cough, shortness of breath and stridor.    Gastrointestinal:  Negative for nausea and vomiting.   Musculoskeletal:  Positive for neck pain.   Hematological:  Negative for adenopathy.   All other systems reviewed and are negative.       PE:  ENT Physical Exam  Constitutional  Appearance: patient appears well-developed and well-groomed, patient is cooperative;  Communication/Voice: communication appropriate for developmental age;  Head and Face  Appearance: head appears normal and face appears normal;  Salivary: Salivary comments: s/p right parotidectomy, scar is well healed.  No recurrent masses or lesions.  Ear  Hearing: impaired to conversational voice (HA removed);  Auricles: right auricle normal; left  auricle normal;  Ear Canals: right ear canal normal; left ear canal normal;  Tympanic Membranes: right tympanic membrane normal; left tympanic membrane normal;  Ear comments: Right EAC is smaller than the left.  There is a small amount of cerumen on the right against the TM without any impaction.  Attempted removal but only a slight film against the TM.    Nose  External Nose: nares patent bilaterally; external nose normal;  Internal Nose: nasal mucosa normal; septum normal;  Oral Cavity/Oropharynx  Lips: normal;  Gums: gingiva normal;  Tongue: normal;  Oral mucosa: normal;  OC/OP comments: OP clear, no masses or lesions  Neck  Neck: scars (healed right parotidectomy scar) and radiation changes present;  Thyroid: thyroid normal;  Respiratory  Inspection: breathing unlabored;  Cardiovascular  Inspection: extremities are warm and well perfused;  Lymphatic  Palpation: no cervical adenopathy noted;  Neurovestibular  Mental Status: alert and oriented;  Psychiatric: mood normal; affect is appropriate;  Cranial Nerves: cranial nerves intact; no facial weakness noted;       Procedures     ASSESSMENT AND PLAN:  Problem List Items Addressed This Visit       Adenoid cystic carcinoma of parotid gland (Multi)    Current Assessment & Plan     No evidence of disease  Follow up in 3-4 months         Relevant Orders    XR chest 2 views    Follow Up In Ent    Adverse effect of radiation    Dry ear canal - Primary    Current Assessment & Plan     Right EAC smaller, dry with mild cerumen against the TM, attempted removal without success  Not impacted but recommend debrox OTC          Hearing loss    Current Assessment & Plan     Has HA and is getting new HA         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    Current Assessment & Plan     Chronic, mild  Recommend conservative treatment         Thyroid nodule    Current Assessment & Plan     - 1.6cm thyroid nodule on the left TIRADS4 on US 5 months ago  - will repeat in August to monitor for  growth  - discussed FNA if nodule is growing          Relevant Orders    US thyroid    Observation for suspected cancer    Relevant Orders    XR chest 2 views        Krysta Abdalla MD    Head & Neck Surgical Oncology & Reconstruction  Department of Otolaryngology - Head and Neck Surgery     By signing my name below, I, Nereida Hoganibe, attest that this documentation has been prepared under the direction and in the presence of Dr. Krysta Abdalla MD.     All medical record entries made by the Scribe were at my direction and personally dictated by me, Dr. Krysta Abdalla. I have reviewed the chart and agree that the record accurately reflects my personal performance of the history, physical exam, discussion and plan.

## 2024-07-31 ENCOUNTER — OFFICE VISIT (OUTPATIENT)
Dept: OTOLARYNGOLOGY | Facility: HOSPITAL | Age: 69
End: 2024-07-31
Payer: COMMERCIAL

## 2024-07-31 VITALS — HEIGHT: 66 IN | WEIGHT: 186 LBS | TEMPERATURE: 97.7 F | BODY MASS INDEX: 29.89 KG/M2

## 2024-07-31 DIAGNOSIS — T66.XXXD ADVERSE EFFECT OF RADIATION, SUBSEQUENT ENCOUNTER: ICD-10-CM
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
## 2024-07-31 DIAGNOSIS — H61.891: Primary | ICD-10-CM

## 2024-07-31 DIAGNOSIS — H90.3 SENSORINEURAL HEARING LOSS (SNHL) OF BOTH EARS: ICD-10-CM

## 2024-07-31 DIAGNOSIS — C07 ADENOID CYSTIC CARCINOMA OF PAROTID GLAND (MULTI): ICD-10-CM

## 2024-07-31 DIAGNOSIS — K11.7 XEROSTOMIA DUE TO RADIOTHERAPY: ICD-10-CM

## 2024-07-31 DIAGNOSIS — E04.1 THYROID NODULE: ICD-10-CM

## 2024-07-31 DIAGNOSIS — Z03.89 OBSERVATION FOR SUSPECTED CANCER: ICD-10-CM

## 2024-07-31 PROCEDURE — 99214 OFFICE O/P EST MOD 30 MIN: CPT | Performed by: OTOLARYNGOLOGY

## 2024-07-31 PROCEDURE — 1157F ADVNC CARE PLAN IN RCRD: CPT | Performed by: OTOLARYNGOLOGY

## 2024-07-31 PROCEDURE — 3008F BODY MASS INDEX DOCD: CPT | Performed by: OTOLARYNGOLOGY

## 2024-07-31 PROCEDURE — 1159F MED LIST DOCD IN RCRD: CPT | Performed by: OTOLARYNGOLOGY

## 2024-07-31 PROCEDURE — 1160F RVW MEDS BY RX/DR IN RCRD: CPT | Performed by: OTOLARYNGOLOGY

## 2024-07-31 ASSESSMENT — PATIENT HEALTH QUESTIONNAIRE - PHQ9
1. LITTLE INTEREST OR PLEASURE IN DOING THINGS: NOT AT ALL
SUM OF ALL RESPONSES TO PHQ9 QUESTIONS 1 & 2: 0
2. FEELING DOWN, DEPRESSED OR HOPELESS: NOT AT ALL

## 2024-08-04 NOTE — ASSESSMENT & PLAN NOTE
- 1.6cm thyroid nodule on the left TIRADS4 on US 5 months ago  - will repeat in August to monitor for growth  - discussed FNA if nodule is growing

## 2024-08-04 NOTE — ASSESSMENT & PLAN NOTE
Right EAC smaller, dry with mild cerumen against the TM, attempted removal without success  Not impacted but recommend debrox OTC

## 2024-08-08 ENCOUNTER — LAB (OUTPATIENT)
Dept: LAB | Facility: LAB | Age: 69
End: 2024-08-08
Payer: COMMERCIAL

## 2024-08-08 DIAGNOSIS — I10 BENIGN ESSENTIAL HYPERTENSION: ICD-10-CM

## 2024-08-08 DIAGNOSIS — E78.5 HYPERLIPIDEMIA, UNSPECIFIED HYPERLIPIDEMIA TYPE: ICD-10-CM

## 2024-08-08 DIAGNOSIS — Z12.5 SCREENING PSA (PROSTATE SPECIFIC ANTIGEN): ICD-10-CM

## 2024-08-08 DIAGNOSIS — E03.9 HYPOTHYROIDISM, UNSPECIFIED TYPE: ICD-10-CM

## 2024-08-08 LAB
ALBUMIN SERPL BCP-MCNC: 4 G/DL (ref 3.4–5)
ALP SERPL-CCNC: 63 U/L (ref 33–136)
ALT SERPL W P-5'-P-CCNC: 14 U/L (ref 10–52)
ANION GAP SERPL CALC-SCNC: 11 MMOL/L (ref 10–20)
APPEARANCE UR: CLEAR
AST SERPL W P-5'-P-CCNC: 14 U/L (ref 9–39)
BILIRUB SERPL-MCNC: 1.3 MG/DL (ref 0–1.2)
BILIRUB UR STRIP.AUTO-MCNC: NEGATIVE MG/DL
BUN SERPL-MCNC: 9 MG/DL (ref 6–23)
CALCIUM SERPL-MCNC: 9.5 MG/DL (ref 8.6–10.6)
CHLORIDE SERPL-SCNC: 107 MMOL/L (ref 98–107)
CHOLEST SERPL-MCNC: 218 MG/DL (ref 0–199)
CHOLESTEROL/HDL RATIO: 3.7
CO2 SERPL-SCNC: 29 MMOL/L (ref 21–32)
COLOR UR: NORMAL
CREAT SERPL-MCNC: 0.85 MG/DL (ref 0.5–1.3)
EGFRCR SERPLBLD CKD-EPI 2021: >90 ML/MIN/1.73M*2
ERYTHROCYTE [DISTWIDTH] IN BLOOD BY AUTOMATED COUNT: 12.3 % (ref 11.5–14.5)
GLUCOSE SERPL-MCNC: 95 MG/DL (ref 74–99)
GLUCOSE UR STRIP.AUTO-MCNC: NORMAL MG/DL
HCT VFR BLD AUTO: 35 % (ref 41–52)
HDLC SERPL-MCNC: 59.5 MG/DL
HGB BLD-MCNC: 11.5 G/DL (ref 13.5–17.5)
KETONES UR STRIP.AUTO-MCNC: NEGATIVE MG/DL
LDLC SERPL CALC-MCNC: 139 MG/DL
LEUKOCYTE ESTERASE UR QL STRIP.AUTO: NEGATIVE
MCH RBC QN AUTO: 28.4 PG (ref 26–34)
MCHC RBC AUTO-ENTMCNC: 32.9 G/DL (ref 32–36)
MCV RBC AUTO: 86 FL (ref 80–100)
MUCOUS THREADS #/AREA URNS AUTO: NORMAL /LPF
NITRITE UR QL STRIP.AUTO: NEGATIVE
NON HDL CHOLESTEROL: 159 MG/DL (ref 0–149)
NRBC BLD-RTO: 0 /100 WBCS (ref 0–0)
PH UR STRIP.AUTO: 7 [PH]
PLATELET # BLD AUTO: 275 X10*3/UL (ref 150–450)
POTASSIUM SERPL-SCNC: 3.9 MMOL/L (ref 3.5–5.3)
PROT SERPL-MCNC: 6.2 G/DL (ref 6.4–8.2)
PROT UR STRIP.AUTO-MCNC: NORMAL MG/DL
PSA SERPL-MCNC: 5.29 NG/ML
RBC # BLD AUTO: 4.05 X10*6/UL (ref 4.5–5.9)
RBC # UR STRIP.AUTO: NEGATIVE /UL
RBC #/AREA URNS AUTO: NORMAL /HPF
SODIUM SERPL-SCNC: 143 MMOL/L (ref 136–145)
SP GR UR STRIP.AUTO: 1.01
TRIGL SERPL-MCNC: 96 MG/DL (ref 0–149)
TSH SERPL-ACNC: 1.38 MIU/L (ref 0.44–3.98)
UROBILINOGEN UR STRIP.AUTO-MCNC: NORMAL MG/DL
VLDL: 19 MG/DL (ref 0–40)
WBC # BLD AUTO: 4.7 X10*3/UL (ref 4.4–11.3)
WBC #/AREA URNS AUTO: NORMAL /HPF

## 2024-08-08 PROCEDURE — 84153 ASSAY OF PSA TOTAL: CPT

## 2024-08-08 PROCEDURE — 81001 URINALYSIS AUTO W/SCOPE: CPT

## 2024-08-08 PROCEDURE — 80061 LIPID PANEL: CPT

## 2024-08-08 PROCEDURE — 84443 ASSAY THYROID STIM HORMONE: CPT

## 2024-08-08 PROCEDURE — 80053 COMPREHEN METABOLIC PANEL: CPT

## 2024-08-08 PROCEDURE — 36415 COLL VENOUS BLD VENIPUNCTURE: CPT

## 2024-08-08 PROCEDURE — 85027 COMPLETE CBC AUTOMATED: CPT

## 2024-08-09 ENCOUNTER — HOSPITAL ENCOUNTER (OUTPATIENT)
Dept: RADIOLOGY | Facility: HOSPITAL | Age: 69
Discharge: HOME | End: 2024-08-09
Payer: COMMERCIAL

## 2024-08-09 DIAGNOSIS — C07 ADENOID CYSTIC CARCINOMA OF PAROTID GLAND (MULTI): ICD-10-CM

## 2024-08-09 DIAGNOSIS — E04.1 THYROID NODULE: ICD-10-CM

## 2024-08-09 DIAGNOSIS — Z03.89 OBSERVATION FOR SUSPECTED CANCER: ICD-10-CM

## 2024-08-09 PROCEDURE — 76536 US EXAM OF HEAD AND NECK: CPT

## 2024-08-09 PROCEDURE — 71046 X-RAY EXAM CHEST 2 VIEWS: CPT

## 2024-08-13 ENCOUNTER — TELEPHONE (OUTPATIENT)
Dept: UROLOGY | Facility: HOSPITAL | Age: 69
End: 2024-08-13
Payer: COMMERCIAL

## 2024-08-22 ENCOUNTER — APPOINTMENT (OUTPATIENT)
Dept: UROLOGY | Facility: HOSPITAL | Age: 69
End: 2024-08-22
Payer: COMMERCIAL

## 2024-08-30 ENCOUNTER — OFFICE VISIT (OUTPATIENT)
Dept: PRIMARY CARE | Facility: CLINIC | Age: 69
End: 2024-08-30
Payer: COMMERCIAL

## 2024-08-30 VITALS
BODY MASS INDEX: 29.89 KG/M2 | DIASTOLIC BLOOD PRESSURE: 76 MMHG | HEIGHT: 66 IN | SYSTOLIC BLOOD PRESSURE: 128 MMHG | WEIGHT: 186 LBS

## 2024-08-30 DIAGNOSIS — E78.5 HYPERLIPIDEMIA, UNSPECIFIED HYPERLIPIDEMIA TYPE: ICD-10-CM

## 2024-08-30 DIAGNOSIS — I10 BENIGN ESSENTIAL HYPERTENSION: Primary | ICD-10-CM

## 2024-08-30 DIAGNOSIS — M17.10 ARTHRITIS OF KNEE: ICD-10-CM

## 2024-08-30 DIAGNOSIS — H91.90 HEARING LOSS, UNSPECIFIED HEARING LOSS TYPE, UNSPECIFIED LATERALITY: ICD-10-CM

## 2024-08-30 PROCEDURE — 99214 OFFICE O/P EST MOD 30 MIN: CPT | Performed by: INTERNAL MEDICINE

## 2024-08-30 PROCEDURE — 3078F DIAST BP <80 MM HG: CPT | Performed by: INTERNAL MEDICINE

## 2024-08-30 PROCEDURE — 1159F MED LIST DOCD IN RCRD: CPT | Performed by: INTERNAL MEDICINE

## 2024-08-30 PROCEDURE — 3074F SYST BP LT 130 MM HG: CPT | Performed by: INTERNAL MEDICINE

## 2024-08-30 PROCEDURE — 1157F ADVNC CARE PLAN IN RCRD: CPT | Performed by: INTERNAL MEDICINE

## 2024-08-30 PROCEDURE — 3008F BODY MASS INDEX DOCD: CPT | Performed by: INTERNAL MEDICINE

## 2024-08-30 ASSESSMENT — ENCOUNTER SYMPTOMS
DEPRESSION: 0
LOSS OF SENSATION IN FEET: 0
OCCASIONAL FEELINGS OF UNSTEADINESS: 0

## 2024-08-31 NOTE — PROGRESS NOTES
"Subjective   Patient ID: Duane O Reid is a 69 y.o. male who presents for Knee Pain.    Mr. Duane Reid today came here for multiple medical issues.  Both knees are hurting. He wants to see specialist.  Overall, okay.  No problem.  He wants to wants to use the hearing aid.  Hearing aid helps him.  He came for follow-up on various conditions.    I have personally reviewed the patient's Past Medical History, Medications, Allergies, Social History, and Family History in the EMR.    Review of Systems   All other systems reviewed and are negative.    Objective   /76   Ht 1.676 m (5' 6\")   Wt 84.4 kg (186 lb)   BMI 30.02 kg/m²     Physical Exam  Vitals reviewed.   Cardiovascular:      Heart sounds: Normal heart sounds, S1 normal and S2 normal. No murmur heard.     No friction rub.   Pulmonary:      Effort: Pulmonary effort is normal.      Breath sounds: Normal breath sounds and air entry.   Abdominal:      Palpations: There is no hepatomegaly, splenomegaly or mass.   Musculoskeletal:      Right lower leg: No edema.      Left lower leg: No edema.   Lymphadenopathy:      Lower Body: No right inguinal adenopathy. No left inguinal adenopathy.   Neurological:      Cranial Nerves: Cranial nerves 2-12 are intact.      Sensory: No sensory deficit.      Motor: Motor function is intact.      Deep Tendon Reflexes: Reflexes are normal and symmetric.     LAB WORK: Laboratory testing discussed.    Assessment/Plan   Problem List Items Addressed This Visit             ICD-10-CM       Cardiac and Vasculature    Benign essential hypertension - Primary I10    Hyperlipidemia E78.5       ENT    Hearing loss H91.90     Other Visit Diagnoses         Codes    Arthritis of knee     M17.10    Relevant Orders    Referral to Physical Therapy    Referral to Orthopaedic Surgery        1. Both knee pain, arthritis.  Referred to Dr. Chaudhry, physical therapy.  2. Hypertension, okay.  3. High cholesterol, stable.  4. Hard of hearing.  Hearing aid " will help.  Monitor.  5. Follow-up in six to eight weeks.  6. Continue to follow.    Scribe Attestation  By signing my name below, IMeghan Scribe attest that this documentation has been prepared under the direction and in the presence of Monica Parnell MD.

## 2024-09-19 NOTE — PROGRESS NOTES
Virtual or Telephone Consent    An interactive audio and video telecommunication system which permits real time communications between the patient (at the originating site) and provider (at the distant site) was utilized to provide this telehealth service.   Verbal consent was requested and obtained from Duane O Reid on this date, 09/20/24 for a telehealth visit.     HPI    69 y.o. male being seen with the following problem list:    Problem list:  BPH and poorly controlled LUTS s/p outpatient HolEP + 100U botox. Path 16g, benign.    2/1/24 - seen for TOV, 200cc in, 225cc out. Urine clear, no issues     2/21/24 - seen for PVR. PVR 44cc. Patient reports an improved stream, improved nocturia. He has no leakage.     05/22/24 - PVR 3cc.  Stream is strong.  No leakage.  No urgency or frequency.  reports of some spraying of the stream, but for the most part doing well.  His brother was diagnosed with prostate cancer.  Overall fairly happy with his outcome     09/19/24 - seen over thv for symptom check. Still having some urinary frequency, has not gotten worse. Splaying of stream has resolved. Wants to observe his frequency for now. Also reports low T level, thinks his energy is ok, but libido isnt great.         Lab Results   Component Value Date    PSA 5.29 (H) 08/08/2024    PSA 3.43 05/02/2022    PSA 3.9 12/02/2021    PSA 4.0 09/09/2021         Current Medications:  Current Outpatient Medications   Medication Sig Dispense Refill    acetaminophen (Tylenol 8 HOUR) 650 mg ER tablet Take 1 tablet (650 mg) by mouth every 8 hours if needed for mild pain (1 - 3). Do not crush, chew, or split.      docusate sodium (Colace) 100 mg capsule Take 1 capsule (100 mg) by mouth 2 times a day. Hold for loose stools 30 capsule 0    fexofenadine (Allegra) 60 mg tablet Take 1 tablet (60 mg) by mouth once daily.      folic acid/multivit-min/lutein (CENTRUM SILVER ORAL) Take 1 tablet by mouth once daily.      ibuprofen 600 mg tablet Take 1  tablet (600 mg) by mouth 4 times a day as needed for mild pain (1 - 3) (pain). 90 tablet 5    pantoprazole (ProtoNix) 40 mg EC tablet Take 1 tablet (40 mg) by mouth once daily. Do not crush, chew, or split. 90 tablet 3     No current facility-administered medications for this visit.        Active Problems:  Duane O Reid is a 69 y.o. male with the following Problems and Medications.  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)    Adverse effect of radiation    Allergic conjunctivitis    Allergic reaction    Allergic rhinitis    Anemia    Arcus senilis of eye    Benign essential hypertension    Bilateral impacted cerumen    Blurred vision, bilateral    Carpal tunnel syndrome    Cataracts, both eyes    Chest pain    Chronic eczematous otitis externa of both ears    Dizziness    Dry ear canal    BPH (benign prostatic hyperplasia)    Enlarged prostate with lower urinary tract symptoms (LUTS)    Enlarged prostate without lower urinary tract symptoms (luts)    Epiretinal membrane (ERM) of left eye    Erectile dysfunction    Hearing loss    Herniated nucleus pulposus, L5-S1, left    Esophageal reflux    Hiatal hernia    Hypothyroidism    Hyperlipidemia    Leg swelling    Lower back pain    Lower urinary tract symptoms (LUTS)    Lump of ear    Mass of parotid gland    Nocturia    Nuclear sclerosis    Overactive bladder    Prostatitis    Lung nodule, multiple    Pulmonary nodule    PVD (posterior vitreous detachment), both eyes    Refractive error    Scalp cyst    Sinusitis    Vitamin D deficiency    Vitreous floaters    Wears eyeglasses    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    Obesity, Class I, BMI 30-34.9    BMI 45.0-49.9, adult (Multi)    Localized swelling, mass and lump, head    Vitamin B deficiency    Urge incontinence of urine    Screening PSA (prostate specific antigen)    Abducens nerve palsy    Arthritis    Disease due to severe acute respiratory syndrome  coronavirus 2 (SARS-CoV-2)    Disorder of bone    Familial hyperlipoproteinemia type IIa    Headache    History of hearing loss    Prediabetes    Urinary tract infection    Thyroid nodule    Observation for suspected cancer     Current Outpatient Medications   Medication Sig Dispense Refill    acetaminophen (Tylenol 8 HOUR) 650 mg ER tablet Take 1 tablet (650 mg) by mouth every 8 hours if needed for mild pain (1 - 3). Do not crush, chew, or split.      docusate sodium (Colace) 100 mg capsule Take 1 capsule (100 mg) by mouth 2 times a day. Hold for loose stools 30 capsule 0    fexofenadine (Allegra) 60 mg tablet Take 1 tablet (60 mg) by mouth once daily.      folic acid/multivit-min/lutein (CENTRUM SILVER ORAL) Take 1 tablet by mouth once daily.      ibuprofen 600 mg tablet Take 1 tablet (600 mg) by mouth 4 times a day as needed for mild pain (1 - 3) (pain). 90 tablet 5    pantoprazole (ProtoNix) 40 mg EC tablet Take 1 tablet (40 mg) by mouth once daily. Do not crush, chew, or split. 90 tablet 3     No current facility-administered medications for this visit.       PMH:  Past Medical History:   Diagnosis Date    Anemia 09/01/2023    H/H 12/37.1    BPH (benign prostatic hyperplasia)     w/ LUTS    Chronic headaches     Constipation     Facial paralysis/Lovejoy palsy 2009    resolved    GERD (gastroesophageal reflux disease)     Head and neck cancer (Multi) 2018    Adenoid cyst carcinoma, s/p resection x 2, chemo and radiation    Hearing aid worn     bilateral    High cholesterol     HL (hearing loss)     Lumbar disc herniation     L5-S1    Osteoarthritis     Other vitreous opacities, unspecified eye     Vitreous floaters    Pure hypercholesterolemia, unspecified 08/29/2013    Low-density-lipoid-type (LDL) hyperlipoproteinemia    Renal insufficiency     Sickle cell trait (CMS-HCC)     Sleep apnea     uses CPAP intermittently-doesn't like to use       PSH:  Past Surgical History:   Procedure Laterality Date    HEMORRHOID  SURGERY  1980    KNEE ARTHROPLASTY Right 2018    SALIVARY GLAND SURGERY  2018    resection    SALIVARY GLAND SURGERY  2020    revision    TOTAL KNEE ARTHROPLASTY Left        FMH:  Family History   Problem Relation Name Age of Onset    Eczema Mother          age 98    Diabetes type II Father           age 96    Arthritis Sister      Diabetes Brother      Addiction problem Half-Sister          overdose    No Known Problems Half-Sister         SHx:  Social History     Tobacco Use    Smoking status: Former     Current packs/day: 0.00     Average packs/day: 0.5 packs/day for 8.0 years (4.0 ttl pk-yrs)     Types: Cigarettes     Start date:      Quit date:      Years since quittin.7     Passive exposure: Never    Smokeless tobacco: Never   Substance Use Topics    Alcohol use: Never     Comment: RARELY-1-2 drinks a month    Drug use: Not Currently     Types: Marijuana     Comment: quit        Allergies:  Allergies   Allergen Reactions    Naproxen Other     Blood in stool    Niacin Other     Patient doesn't remember       Assessment/Plan    PSA elevation noted, will repeat in 6 weeks. Splaying of stream resolved. Some frequency, not getting worse, wants to observe for now. Low T, low libido, will address after repeat PSA.    Fu in ofice in 6 weeks with PVR, PSA prior        Scribe Attestation  By signing my name below, I, Litzy Robles, attest that this documentation  has been prepared under the direction and in the presence of Miller Zavala MD.

## 2024-09-20 ENCOUNTER — TELEMEDICINE (OUTPATIENT)
Dept: UROLOGY | Facility: HOSPITAL | Age: 69
End: 2024-09-20
Payer: COMMERCIAL

## 2024-09-20 DIAGNOSIS — R79.89 LOW TESTOSTERONE: ICD-10-CM

## 2024-09-20 DIAGNOSIS — R35.0 BENIGN PROSTATIC HYPERPLASIA WITH URINARY FREQUENCY: Primary | ICD-10-CM

## 2024-09-20 DIAGNOSIS — R68.82 LOW LIBIDO: ICD-10-CM

## 2024-09-20 DIAGNOSIS — R97.20 ELEVATED PSA: ICD-10-CM

## 2024-09-20 DIAGNOSIS — N40.1 BENIGN PROSTATIC HYPERPLASIA WITH URINARY FREQUENCY: Primary | ICD-10-CM

## 2024-09-20 PROCEDURE — 99214 OFFICE O/P EST MOD 30 MIN: CPT | Performed by: UROLOGY

## 2024-09-20 PROCEDURE — 1157F ADVNC CARE PLAN IN RCRD: CPT | Performed by: UROLOGY

## 2024-09-20 PROCEDURE — G2211 COMPLEX E/M VISIT ADD ON: HCPCS | Performed by: UROLOGY

## 2024-10-08 ENCOUNTER — APPOINTMENT (OUTPATIENT)
Dept: ORTHOPEDIC SURGERY | Facility: CLINIC | Age: 69
End: 2024-10-08
Payer: COMMERCIAL

## 2024-10-08 DIAGNOSIS — M17.10 ARTHRITIS OF KNEE: ICD-10-CM

## 2024-10-08 DIAGNOSIS — Z96.652 CHRONIC KNEE PAIN AFTER TOTAL REPLACEMENT OF LEFT KNEE JOINT: Primary | ICD-10-CM

## 2024-10-08 DIAGNOSIS — G89.29 CHRONIC KNEE PAIN AFTER TOTAL REPLACEMENT OF LEFT KNEE JOINT: Primary | ICD-10-CM

## 2024-10-08 DIAGNOSIS — M25.562 CHRONIC KNEE PAIN AFTER TOTAL REPLACEMENT OF LEFT KNEE JOINT: Primary | ICD-10-CM

## 2024-10-08 PROCEDURE — 1159F MED LIST DOCD IN RCRD: CPT | Performed by: FAMILY MEDICINE

## 2024-10-08 PROCEDURE — 99203 OFFICE O/P NEW LOW 30 MIN: CPT | Performed by: FAMILY MEDICINE

## 2024-10-08 PROCEDURE — 1157F ADVNC CARE PLAN IN RCRD: CPT | Performed by: FAMILY MEDICINE

## 2024-10-08 PROCEDURE — 1036F TOBACCO NON-USER: CPT | Performed by: FAMILY MEDICINE

## 2024-10-08 NOTE — PROGRESS NOTES
** Please excuse any errors in grammar or translation related to this dictation. Voice recognition software was utilized to prepare this document. **    Assessment & Plan:  Dx: Left knee pain s/p arthroplasty    Reassured patient that he has full active range of motion and strength compared to contralateral.  No provoked symptoms on exam.  No instability of the knee.  X-rays from June demonstrates normal post arthroplasty changes.  He is scheduled to start physical therapy later this month which I do think will be beneficial for his perceived knee pain.  However if has worsening pain or no improvement following physical therapy did advise he follow-up with one of my joint replacement colleagues was provided the contact information for Dr. Gagnon's office.    All questions answered and patient is agreeable to plan of care.      Chief complaint:  Left knee pain  HPI:  70 y/o patient, hx of bilateral knee arthroplasty's(L 2013, R 2018), presents with left knee pain.  This complaint has been ongoing for 4-5 months though increased in August after tripping in his yard while gardening.  He did not fall onto the knee. Symptoms are intermittently occurring.  Pain is most prominent at lateral knee.  Symptoms are aggravated by extending the knee after having it bent for extended periods, stepping with full weight onto left knee. To date, patient has tried a variety of treatments to include tylenol, biofreeze, ice with little sustained effect.      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)    Adverse effect of radiation    Allergic conjunctivitis    Allergic reaction    Allergic rhinitis    Anemia    Arcus senilis of eye    Benign essential hypertension    Bilateral impacted cerumen    Blurred vision, bilateral    Carpal tunnel syndrome    Cataracts, both eyes    Chest pain    Chronic eczematous otitis externa of both ears    Dizziness    Dry ear canal    BPH  (benign prostatic hyperplasia)    Enlarged prostate with lower urinary tract symptoms (LUTS)    Enlarged prostate without lower urinary tract symptoms (luts)    Epiretinal membrane (ERM) of left eye    Erectile dysfunction    Hearing loss    Herniated nucleus pulposus, L5-S1, left    Esophageal reflux    Hiatal hernia    Hypothyroidism    Hyperlipidemia    Leg swelling    Lower back pain    Lower urinary tract symptoms (LUTS)    Lump of ear    Mass of parotid gland    Nocturia    Nuclear sclerosis    Overactive bladder    Prostatitis    Lung nodule, multiple    Pulmonary nodule    PVD (posterior vitreous detachment), both eyes    Refractive error    Scalp cyst    Sinusitis    Vitamin D deficiency    Vitreous floaters    Wears eyeglasses    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    Obesity, Class I, BMI 30-34.9    BMI 45.0-49.9, adult (Multi)    Localized swelling, mass and lump, head    Vitamin B deficiency    Urge incontinence of urine    Screening PSA (prostate specific antigen)    Abducens nerve palsy    Arthritis    Disease due to severe acute respiratory syndrome coronavirus 2 (SARS-CoV-2)    Disorder of bone    Familial hyperlipoproteinemia type IIa    Headache    History of hearing loss    Prediabetes    Urinary tract infection    Thyroid nodule    Observation for suspected cancer    Low libido    Low testosterone    Elevated PSA     Past Surgical History:   Procedure Laterality Date    HEMORRHOID SURGERY  1980    KNEE ARTHROPLASTY Right 2018    SALIVARY GLAND SURGERY  2018    resection    SALIVARY GLAND SURGERY  2020    revision    TOTAL KNEE ARTHROPLASTY Left 2011     Current Outpatient Medications on File Prior to Visit   Medication Sig Dispense Refill    acetaminophen (Tylenol 8 HOUR) 650 mg ER tablet Take 1 tablet (650 mg) by mouth every 8 hours if needed for mild pain (1 - 3). Do not crush, chew, or split.      docusate sodium (Colace) 100 mg capsule Take 1 capsule (100 mg) by mouth 2 times a day. Hold for  loose stools 30 capsule 0    fexofenadine (Allegra) 60 mg tablet Take 1 tablet (60 mg) by mouth once daily.      folic acid/multivit-min/lutein (CENTRUM SILVER ORAL) Take 1 tablet by mouth once daily.      ibuprofen 600 mg tablet Take 1 tablet (600 mg) by mouth 4 times a day as needed for mild pain (1 - 3) (pain). 90 tablet 5    pantoprazole (ProtoNix) 40 mg EC tablet Take 1 tablet (40 mg) by mouth once daily. Do not crush, chew, or split. 90 tablet 3     No current facility-administered medications on file prior to visit.       Exam:  LEFT Knee examined. No effusion, ecchymosis, warmth or erythema.  Vertical incision scar present. AROM from 0 to 130 deg with 5/5 strength. SILT overlying knee. No retropatellar crepitus. No tenderness along medial or lateral joint lines.  No popliteal mass palpated. Negative anterior and posterior drawer.  No laxity to varus or valgus stress at 0 or 30 deg.  No patellar apprehension.      General Exam:  Constitutional - NAD, AAO x 3, conversing appropriately.  HEENT- Normocephalic and atraumatic. No facial deformities. Hearing grossly normal.  Lungs - Breathing non-labored with normal rate. No accessory muscle use.  CV - Extremities warm and well-perfused, brisk capillary refill present.   Neuro - CN II-XII grossly intact.    Results:  Xrays of left knee obtained 10/8/24 personally interpreted as normal appearance of arthroplasty. No evidence of loosening or periprosthetic fracture.     Lab Results   Component Value Date    HGBA1C 4.9 06/26/2024    CREATININE 0.85 08/08/2024    EGFR >90 08/08/2024

## 2024-10-08 NOTE — LETTER
October 8, 2024     Monica Parnell MD  [address] [place] [address]   [facility], [address]  [place] [address]    Patient: Duane O Reid   YOB: 1955   Date of Visit: 10/8/2024       Dear Dr. Monica Parnell MD:    Thank you for referring Duane Reid to me for evaluation. Below are my notes for this consultation.  If you have questions, please do not hesitate to call me. I look forward to following your patient along with you.       Sincerely,     Tru Wisdom, DO      CC: No Recipients  ______________________________________________________________________________________    ** Please excuse any errors in grammar or translation related to this dictation. Voice recognition software was utilized to prepare this document. **    Assessment & Plan:  Dx: Left knee pain s/p arthroplasty    Reassured patient that he has full active range of motion and strength compared to contralateral.  No provoked symptoms on exam.  No instability of the knee.  X-rays from June demonstrates normal post arthroplasty changes.  He is scheduled to start physical therapy later this month which I do think will be beneficial for his perceived knee pain.  However if has worsening pain or no improvement following physical therapy did advise he follow-up with one of my joint replacement colleagues was provided the contact information for Dr. Gagnon's office.    All questions answered and patient is agreeable to plan of care.      Chief complaint:  Left knee pain  HPI:  70 y/o patient, hx of bilateral knee arthroplasty's(L 2013, R 2018), presents with left knee pain.  This complaint has been ongoing for 4-5 months though increased in August after tripping in his yard while gardening.  He did not fall onto the knee. Symptoms are intermittently occurring.  Pain is most prominent at lateral knee.  Symptoms are aggravated by extending the knee after having it bent for extended periods, stepping with full weight onto left knee. To  date, patient has tried a variety of treatments to include tylenol, biofreeze, ice with little sustained effect.      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)   • Adverse effect of radiation   • Allergic conjunctivitis   • Allergic reaction   • Allergic rhinitis   • Anemia   • Arcus senilis of eye   • Benign essential hypertension   • Bilateral impacted cerumen   • Blurred vision, bilateral   • Carpal tunnel syndrome   • Cataracts, both eyes   • Chest pain   • Chronic eczematous otitis externa of both ears   • Dizziness   • Dry ear canal   • BPH (benign prostatic hyperplasia)   • Enlarged prostate with lower urinary tract symptoms (LUTS)   • Enlarged prostate without lower urinary tract symptoms (luts)   • Epiretinal membrane (ERM) of left eye   • Erectile dysfunction   • Hearing loss   • Herniated nucleus pulposus, L5-S1, left   • Esophageal reflux   • Hiatal hernia   • Hypothyroidism   • Hyperlipidemia   • Leg swelling   • Lower back pain   • Lower urinary tract symptoms (LUTS)   • Lump of ear   • Mass of parotid gland   • Nocturia   • Nuclear sclerosis   • Overactive bladder   • Prostatitis   • Lung nodule, multiple   • Pulmonary nodule   • PVD (posterior vitreous detachment), both eyes   • Refractive error   • Scalp cyst   • Sinusitis   • Vitamin D deficiency   • Vitreous floaters   • Wears eyeglasses   • 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   • Obesity, Class I, BMI 30-34.9   • BMI 45.0-49.9, adult (Multi)   • Localized swelling, mass and lump, head   • Vitamin B deficiency   • Urge incontinence of urine   • Screening PSA (prostate specific antigen)   • Abducens nerve palsy   • Arthritis   • Disease due to severe acute respiratory syndrome coronavirus 2 (SARS-CoV-2)   • Disorder of bone   • Familial hyperlipoproteinemia type IIa   • Headache   • History of hearing loss   • Prediabetes   • Urinary tract infection   • Thyroid nodule   • Observation  for suspected cancer   • Low libido   • Low testosterone   • Elevated PSA     Past Surgical History:   Procedure Laterality Date   • HEMORRHOID SURGERY  1980   • KNEE ARTHROPLASTY Right 2018   • SALIVARY GLAND SURGERY  2018    resection   • SALIVARY GLAND SURGERY  2020    revision   • TOTAL KNEE ARTHROPLASTY Left 2011     Current Outpatient Medications on File Prior to Visit   Medication Sig Dispense Refill   • acetaminophen (Tylenol 8 HOUR) 650 mg ER tablet Take 1 tablet (650 mg) by mouth every 8 hours if needed for mild pain (1 - 3). Do not crush, chew, or split.     • docusate sodium (Colace) 100 mg capsule Take 1 capsule (100 mg) by mouth 2 times a day. Hold for loose stools 30 capsule 0   • fexofenadine (Allegra) 60 mg tablet Take 1 tablet (60 mg) by mouth once daily.     • folic acid/multivit-min/lutein (CENTRUM SILVER ORAL) Take 1 tablet by mouth once daily.     • ibuprofen 600 mg tablet Take 1 tablet (600 mg) by mouth 4 times a day as needed for mild pain (1 - 3) (pain). 90 tablet 5   • pantoprazole (ProtoNix) 40 mg EC tablet Take 1 tablet (40 mg) by mouth once daily. Do not crush, chew, or split. 90 tablet 3     No current facility-administered medications on file prior to visit.       Exam:  LEFT Knee examined. No effusion, ecchymosis, warmth or erythema.  Vertical incision scar present. AROM from 0 to 130 deg with 5/5 strength. SILT overlying knee. No retropatellar crepitus. No tenderness along medial or lateral joint lines.  No popliteal mass palpated. Negative anterior and posterior drawer.  No laxity to varus or valgus stress at 0 or 30 deg.  No patellar apprehension.      General Exam:  Constitutional - NAD, AAO x 3, conversing appropriately.  HEENT- Normocephalic and atraumatic. No facial deformities. Hearing grossly normal.  Lungs - Breathing non-labored with normal rate. No accessory muscle use.  CV - Extremities warm and well-perfused, brisk capillary refill present.   Neuro - CN II-XII grossly  intact.    Results:  Xrays of left knee obtained 10/8/24 personally interpreted as normal appearance of arthroplasty. No evidence of loosening or periprosthetic fracture.     Lab Results   Component Value Date    HGBA1C 4.9 06/26/2024    CREATININE 0.85 08/08/2024    EGFR >90 08/08/2024

## 2024-10-22 ENCOUNTER — EVALUATION (OUTPATIENT)
Dept: PHYSICAL THERAPY | Facility: CLINIC | Age: 69
End: 2024-10-22
Payer: COMMERCIAL

## 2024-10-22 DIAGNOSIS — M17.10 ARTHRITIS OF KNEE: ICD-10-CM

## 2024-10-22 DIAGNOSIS — M25.562 LEFT KNEE PAIN, UNSPECIFIED CHRONICITY: Primary | ICD-10-CM

## 2024-10-22 PROCEDURE — 97110 THERAPEUTIC EXERCISES: CPT | Mod: GP

## 2024-10-22 PROCEDURE — 97161 PT EVAL LOW COMPLEX 20 MIN: CPT | Mod: GP

## 2024-10-22 ASSESSMENT — ENCOUNTER SYMPTOMS
LOSS OF SENSATION IN FEET: 0
DEPRESSION: 0
OCCASIONAL FEELINGS OF UNSTEADINESS: 0

## 2024-10-22 ASSESSMENT — PAIN - FUNCTIONAL ASSESSMENT: PAIN_FUNCTIONAL_ASSESSMENT: 0-10

## 2024-10-22 ASSESSMENT — PAIN SCALES - GENERAL: PAINLEVEL_OUTOF10: 0 - NO PAIN

## 2024-10-22 NOTE — PROGRESS NOTES
Physical Therapy    Physical Therapy Evaluation and Treatment    Patient Name: Duane O Reid  MRN: [id number]  Today's Date: 10/22/2024  Time Calculation  Start Time: 1718  Stop Time: 1803  Time Calculation (min): 45 min  PT Evaluation Time Entry  PT Evaluation (Low) Time Entry: 35  PT Therapeutic Procedures Time Entry  Therapeutic Exercise Time Entry: 10    Visit Number: 1   Insurance: [facility] Dual    Plan of Care:   Primary Diagnosis: L knee pain    Assessment:   Duane O Reid  is a 69 y.o. old patient who participated in a physical therapy evaluation today due to L knee pain s/p fall. Patient with a past medical history of bilateral knee arthroplasty's (L 2013, R 2018). Patient presents with impaired bilateral LE strength/AROM, L knee tenderness along lateral joint line, L knee pain jhony with stairs and following prolonged sitting or lying positioning. Due to these impairments, he has the following functional limitations and participation restrictions: gait deviations, low fall risk based on STEADI, stair negotiation, transfers, performing yard work activities such as cutting grass. Patient would benefit from skilled physical therapy services to improve above mentioned impairments, establish home program for management of knee pain, and allow for independent and safe functional mobility. The patient verbalized understanding and is in agreement with all goals and plan of care. Plan of care was developed with input and agreement by the patient    Plan:   OP PT Plan  Treatment/Interventions: Cryotherapy, Dry needling, Education/ Instruction, Gait training, Manual therapy, Neuromuscular re-education, Self care/ home management, Therapeutic activities, Therapeutic exercises  PT Plan: Skilled PT  PT Frequency: 1 time per week  Duration: 4-8 visits  Onset Date: 10/08/24  Certification Period Start Date: 10/22/24  Certification Period End Date: 01/20/25  Rehab Potential: Good  Plan of Care Agreement: Patient    Current  Problem:  Problem List Items Addressed This Visit    None  Visit Diagnoses         Codes    Left knee pain, unspecified chronicity    -  Primary M25.562    Arthritis of knee     M17.10            HPI per Tru Wisdom DO Note on 10/8/24:  70 y/o patient, hx of bilateral knee arthroplasty's(L 2013, R 2018), presents with left knee pain. This complaint has been ongoing for 4-5 months though increased in August after tripping in his yard while gardening.  He did not fall onto the knee. Symptoms are intermittently occurring.  Pain is most prominent at lateral knee.  Symptoms are aggravated by extending the knee after having it bent for extended periods, stepping with full weight onto left knee. To date, patient has tried a variety of treatments to include tylenol, biofreeze, ice with little sustained effect.    Subjective   Duane O Reid  is a 69 y.o. male  presenting to the clinic with chief complaint of L knee pain post a fall onto his L knee as he tripped on the rail of a bed swing in his yard on 7/2024. He states that he has L knee pain with knee extension jhnoy after prolonged knee flexion positioning. He reports pain with stepping down the stairs radiating from the ankle to distal knee (points to patellar tendon). Patient states that he has had bilateral TKA in 2011 and 2011. He states that the knee does not bother him during sleep.   He states that the knee pain is intermittent and is exacerbated by knee flexion/ext after prolonged positioning and stepping down. He states that ice and biofreeze improve the pain.   Average knee pain: 3-4/10  Worst knee pain: 6/10  Best knee pain; 0/10    PREVIOUS LEVEL OF FUNCTION: Independent Living, Independent ADLs/IADLs, Independent Ambulation, (+) Driving  CURRENT DME: cane, RW, rollator      SOCIAL HISTORY/LIVING ARRANGEMENT:   Patient lives with SO and grandson in a 1-story home with 1-2 KIMI and no HR  Patient with first floor bedroom/bathroom/laundry    PARTICIPATION  RESTRICTIONS: yard work - cutting grass      EXERCISE/ACTIVITY LEVEL:  sedentary    PATIENT GOAL: reduce L knee pain     General:  General  Reason for Referral: L knee arthritis  Referred By: Monica Parnell MD  Preferred Learning Style: auditory, kinesthetic, visual, written  Payor: [employer] DUAL COMPLETE / Plan: [employer] DUAL COMPLETE / Product Type: *No Product type* /   Monica Parnell    Precautions  Precautions  STEADI Fall Risk Score (The score of 4 or more indicates an increased risk of falling): 4       Pain  Pain Assessment: 0-10  0-10 (Numeric) Pain Score: 0 - No pain       Objective     Palpation: (+) TTP along L lateral knee joint line  Joint Mobility: WNL for bilateral patellar joints  Flexibility: Quad and HS impaired bilaterally  Posture: rounded shoulders, forward head  Transfers: Indep  Bed Mobility: Indep  Gait: slightly antalgic   Stairs: NT this date    Numbness/Tingling/Paresthesia: denies    Dermatomes B LE:  EC WNL to light touch bilat unless otherwise noted  Mid-Anterior Thigh (L2):  Medial Knee (L3):  Medial Malleolus (L4):  Dorsal Second Toe Web Space (L5):  Lateral Malleolus (S1):    Lower Extremity Strength:   Date: eval RIGHT LEFT   Hip Flexion 4+ 4+   Hip Extension 4+ 4+   Hip Abduction 4 4   Knee Extension 5 4+   Knee Flexion 4+ 4 p!   Ankle DF 5 5   Ankle PF 5 5     Lower Extremity ROM:  Date: eval RIGHT LEFT   Hip Flexion 100 100   Knee Extension 0 0   Knee Flexion 110 p! 115 p!       Special tests:  KNEE RIGHT LEFT   Joint Line Tenderness neg pos   Valgus stress neg neg   Varus Stress neg neg       Outcome Measures:  Other Measures  Lower Extremity Funtional Score (LEFS): 61    Treatment:  Therapeutic Exercise x 10 min  Educated pt regarding benefit and purpose of skilled PT services along with results of examination findings and how this correlates to their chief complaint.   Educated patient on purpose of exercises and importance of regular daily home program  compliance  Provided patient with visual, verbal, and written instruction via printed home program handout to ensure carryover    - Supine Heel Slide  x 10 R/L  - Supine Quadricep Sets  x 10 L  - Supine Quadriceps Stretch with Strap on Table 2 x 30 sec holds L  - Supine Hamstring Stretch with Strap  2 x 30 sec holds L  - Seated Long Arc Quad  x 10  - Knee Pendulum Swings  x 10    EDUCATION: see treatment section above for details     HOME EXERCISE PROGRAM:  Access Code: [email or web address]  URL: [email or web address]  Date: 10/22/2024  Prepared by: Vikki Maynard    Exercises  - Supine Heel Slide  - 1 x daily - 7 x weekly - 3 sets - 10 reps  - Supine Quadricep Sets  - 1 x daily - 7 x weekly - 3 sets - 10 reps  - Supine Quadriceps Stretch with Strap on Table  - 1 x daily - 7 x weekly - 3 sets - 10 reps  - Supine Hamstring Stretch with Strap  - 1 x daily - 7 x weekly - 3 sets - 10 reps  - Seated Long Arc Quad  - 1 x daily - 7 x weekly - 3 sets - 10 reps  - Knee Pendulum Swings  - 1 x daily - 7 x weekly - 3 sets - 10 reps     Goals:  Active       PT Problem       Duane O Reid will improve LEFS score by at least 9 points (minimal clinically important difference) in order to reflect increased ease in completing ADL's/IADL's.         Start:  10/22/24    Expected End:  01/20/25            Duane O Reid to increase B hip and knee strength by >/= 1/3 MMT grade to improve dynamic stability during yard work and stair negotiation.       Start:  10/22/24    Expected End:  01/20/25            Duane O Pantera will improve bilateral quad and HS flexibility in order to allow decreased report of pain during prolonged positioning such as sitting and return to PLOF       Start:  10/22/24    Expected End:  01/20/25            Duane O Reid will report </= 4/10 L knee pain at worst during stair negotiation to improve tolerance to functional mobility       Start:  10/22/24    Expected End:  01/20/25            Duane O Reid will  demonstrate independence and report compliance with HEP to facilitate independent rehab program upon discharge.       Start:  10/22/24    Expected End:  01/20/25            Patient Stated Goal - reduce L knee pain       Start:  10/22/24    Expected End:  01/20/25

## 2024-10-26 ENCOUNTER — LAB (OUTPATIENT)
Dept: LAB | Facility: LAB | Age: 69
End: 2024-10-26
Payer: COMMERCIAL

## 2024-10-26 DIAGNOSIS — R97.20 ELEVATED PSA: ICD-10-CM

## 2024-10-26 LAB — PSA SERPL-MCNC: 5.4 NG/ML

## 2024-10-26 PROCEDURE — 84153 ASSAY OF PSA TOTAL: CPT

## 2024-10-26 PROCEDURE — 36415 COLL VENOUS BLD VENIPUNCTURE: CPT

## 2024-10-31 ENCOUNTER — OFFICE VISIT (OUTPATIENT)
Dept: UROLOGY | Facility: HOSPITAL | Age: 69
End: 2024-10-31
Payer: COMMERCIAL

## 2024-10-31 DIAGNOSIS — R39.15 URGENCY OF URINATION: ICD-10-CM

## 2024-10-31 DIAGNOSIS — R35.0 BENIGN PROSTATIC HYPERPLASIA WITH URINARY FREQUENCY: Primary | ICD-10-CM

## 2024-10-31 DIAGNOSIS — N40.1 BENIGN PROSTATIC HYPERPLASIA WITH URINARY FREQUENCY: Primary | ICD-10-CM

## 2024-10-31 DIAGNOSIS — R97.20 ELEVATED PSA: ICD-10-CM

## 2024-10-31 PROCEDURE — 99214 OFFICE O/P EST MOD 30 MIN: CPT | Performed by: UROLOGY

## 2024-10-31 PROCEDURE — 51798 US URINE CAPACITY MEASURE: CPT | Performed by: UROLOGY

## 2024-10-31 PROCEDURE — G2211 COMPLEX E/M VISIT ADD ON: HCPCS | Performed by: UROLOGY

## 2024-10-31 PROCEDURE — 1157F ADVNC CARE PLAN IN RCRD: CPT | Performed by: UROLOGY

## 2024-10-31 RX ORDER — SOLIFENACIN SUCCINATE 5 MG/1
5 TABLET, FILM COATED ORAL DAILY
Qty: 30 TABLET | Refills: 5 | Status: SHIPPED | OUTPATIENT
Start: 2024-10-31 | End: 2025-04-29

## 2024-11-06 ENCOUNTER — APPOINTMENT (OUTPATIENT)
Dept: OPHTHALMOLOGY | Facility: CLINIC | Age: 69
End: 2024-11-06
Payer: COMMERCIAL

## 2024-11-06 DIAGNOSIS — H52.13 MYOPIA OF BOTH EYES WITH REGULAR ASTIGMATISM AND PRESBYOPIA: Primary | ICD-10-CM

## 2024-11-06 DIAGNOSIS — H43.813 PVD (POSTERIOR VITREOUS DETACHMENT), BOTH EYES: ICD-10-CM

## 2024-11-06 DIAGNOSIS — R73.03 PREDIABETES: ICD-10-CM

## 2024-11-06 DIAGNOSIS — H25.13 AGE-RELATED NUCLEAR CATARACT OF BOTH EYES: ICD-10-CM

## 2024-11-06 DIAGNOSIS — H35.372 EPIRETINAL MEMBRANE (ERM) OF LEFT EYE: ICD-10-CM

## 2024-11-06 DIAGNOSIS — H52.223 MYOPIA OF BOTH EYES WITH REGULAR ASTIGMATISM AND PRESBYOPIA: Primary | ICD-10-CM

## 2024-11-06 DIAGNOSIS — H52.4 MYOPIA OF BOTH EYES WITH REGULAR ASTIGMATISM AND PRESBYOPIA: Primary | ICD-10-CM

## 2024-11-06 PROBLEM — H25.10 NUCLEAR SCLEROSIS: Status: RESOLVED | Noted: 2023-05-30 | Resolved: 2024-11-06

## 2024-11-06 PROBLEM — H18.419 ARCUS SENILIS OF EYE: Status: RESOLVED | Noted: 2023-05-30 | Resolved: 2024-11-06

## 2024-11-06 PROBLEM — Z97.3 WEARS EYEGLASSES: Status: RESOLVED | Noted: 2023-05-30 | Resolved: 2024-11-06

## 2024-11-06 PROBLEM — H10.10 ALLERGIC CONJUNCTIVITIS: Status: RESOLVED | Noted: 2023-05-30 | Resolved: 2024-11-06

## 2024-11-06 PROBLEM — H52.7 REFRACTIVE ERROR: Status: RESOLVED | Noted: 2023-05-30 | Resolved: 2024-11-06

## 2024-11-06 PROBLEM — H26.9 CATARACTS, BOTH EYES: Status: RESOLVED | Noted: 2023-05-30 | Resolved: 2024-11-06

## 2024-11-06 PROBLEM — H53.8 BLURRED VISION, BILATERAL: Status: RESOLVED | Noted: 2023-05-30 | Resolved: 2024-11-06

## 2024-11-06 PROBLEM — H43.399 VITREOUS FLOATERS: Status: RESOLVED | Noted: 2023-05-30 | Resolved: 2024-11-06

## 2024-11-06 PROCEDURE — 92004 COMPRE OPH EXAM NEW PT 1/>: CPT | Performed by: STUDENT IN AN ORGANIZED HEALTH CARE EDUCATION/TRAINING PROGRAM

## 2024-11-06 PROCEDURE — 92015 DETERMINE REFRACTIVE STATE: CPT | Performed by: STUDENT IN AN ORGANIZED HEALTH CARE EDUCATION/TRAINING PROGRAM

## 2024-11-06 PROCEDURE — 92134 CPTRZ OPH DX IMG PST SGM RTA: CPT | Performed by: STUDENT IN AN ORGANIZED HEALTH CARE EDUCATION/TRAINING PROGRAM

## 2024-11-06 ASSESSMENT — REFRACTION_MANIFEST
OD_SPHERE: -3.75
OD_AXIS: 045
OS_AXIS: 120
OD_ADD: +2.50
OS_CYLINDER: -0.75
OS_SPHERE: -4.00
OD_CYLINDER: -0.50
OS_ADD: +2.50

## 2024-11-06 ASSESSMENT — EXTERNAL EXAM - RIGHT EYE: OD_EXAM: NORMAL

## 2024-11-06 ASSESSMENT — ENCOUNTER SYMPTOMS
CARDIOVASCULAR NEGATIVE: 0
ALLERGIC/IMMUNOLOGIC NEGATIVE: 0
ENDOCRINE NEGATIVE: 0
HEMATOLOGIC/LYMPHATIC NEGATIVE: 0
PSYCHIATRIC NEGATIVE: 0
GASTROINTESTINAL NEGATIVE: 0
CONSTITUTIONAL NEGATIVE: 0
EYES NEGATIVE: 0
NEUROLOGICAL NEGATIVE: 0
RESPIRATORY NEGATIVE: 0
MUSCULOSKELETAL NEGATIVE: 0

## 2024-11-06 ASSESSMENT — TONOMETRY
IOP_METHOD: GOLDMANN APPLANATION
OD_IOP_MMHG: 19
OS_IOP_MMHG: 20

## 2024-11-06 ASSESSMENT — CUP TO DISC RATIO
OD_RATIO: 0.40
OS_RATIO: 0.40

## 2024-11-06 ASSESSMENT — CONF VISUAL FIELD
OD_SUPERIOR_TEMPORAL_RESTRICTION: 0
OS_INFERIOR_NASAL_RESTRICTION: 0
OS_NORMAL: 1
OD_SUPERIOR_NASAL_RESTRICTION: 0
OS_INFERIOR_TEMPORAL_RESTRICTION: 0
OD_NORMAL: 1
OS_SUPERIOR_NASAL_RESTRICTION: 0
OD_INFERIOR_TEMPORAL_RESTRICTION: 0
OD_INFERIOR_NASAL_RESTRICTION: 0
OS_SUPERIOR_TEMPORAL_RESTRICTION: 0
METHOD: COUNTING FINGERS

## 2024-11-06 ASSESSMENT — VISUAL ACUITY
OS_CC+: -1
OD_CC+: -1
METHOD: SNELLEN - LINEAR
OD_CC: 20/20
OS_CC: 20/20
CORRECTION_TYPE: GLASSES

## 2024-11-06 ASSESSMENT — EXTERNAL EXAM - LEFT EYE: OS_EXAM: NORMAL

## 2024-11-06 ASSESSMENT — REFRACTION_WEARINGRX
OD_AXIS: 080
OD_SPHERE: -3.75
OD_CYLINDER: -1.25
OD_ADD: +2.50
OS_AXIS: 108
OS_CYLINDER: -0.50
OS_ADD: +2.50
OS_SPHERE: -4.00

## 2024-11-06 NOTE — PROGRESS NOTES
Assessment/Plan   Diagnoses and all orders for this visit:  Myopia of both eyes with regular astigmatism and presbyopia  -New Rx for glasses given per patient request. Patient's signature obtained to acknowledge and confirm that a paper copy of glasses Rx was given to patient in compliance with [facility] Eyeglass Rule. Electronic copy of Rx will also be available via [employer].   Epiretinal membrane (ERM) of left eye  -stable on OCT MAC and DFE today  -pt ed; monitor  -non visually significant.  PVD (posterior vitreous detachment), both eyes  -stable retinal exam  -Discussed signs and symtpoms of retinal hole, tear, detachment. Patient educated that retinal detachment can lead to permanent vision loss. Patient consents to return if they notice new floaters, flashes, curtain or veil covering vision.  Age-related nuclear cataract of both eyes  -mild visual significance at this time. Pt ed. Monitor   Prediabetes  -no retinopathy observed on exam today od/os, pt ed to continue good BGlc, blood pressure and lipid control, rtc with any changes in vision, otherwise monitor 1 year  -hx of radiation right side-no signs of retinopathy  Hx of an Ischemic Abducens Nerve Palsy Right eye   -noted on chart review in 2009    RTC 1 year for annual with DFE, MRX, OCT MAC

## 2024-11-13 ENCOUNTER — HOSPITAL ENCOUNTER (OUTPATIENT)
Dept: RADIOLOGY | Facility: HOSPITAL | Age: 69
Discharge: HOME | End: 2024-11-13

## 2024-11-13 DIAGNOSIS — R97.20 ELEVATED PSA: ICD-10-CM

## 2024-11-13 PROCEDURE — 6100000002 MR PROSTATE SCREENING SELF PAY EXAM

## 2024-11-20 NOTE — PROGRESS NOTES
HPI    69 y.o. male being seen with the following problem list:    Problem list:  BPH and poorly controlled LUTS s/p outpatient HolEP + 100U botox 1/30/24. Path 16g, benign.     2/1/24 - seen for TOV, 200cc in, 225cc out. Urine clear, no issues     2/21/24 - seen for PVR. PVR 44cc. Patient reports an improved stream, improved nocturia. He has no leakage.     05/22/24 - PVR 3cc.  Stream is strong.  No leakage.  No urgency or frequency.  reports of some spraying of the stream, but for the most part doing well.  His brother was diagnosed with prostate cancer.  Overall fairly happy with his outcome     09/19/24 - seen over thv for symptom check. Still having some urinary frequency, has not gotten worse. Splaying of stream has resolved. Wants to observe his frequency for now. Also reports low T level, thinks his energy is ok, but libido isnt great.      6/2024 T 249     10/31/24 - PVR 31cc. Doing well overall, voiding well, some frequency, nocturia 2-3x. No prostate bx before. Would like to try a med for urgency and frequency.    11/13/24 MRI prostate  The peripheral zone is diffusely heterogenous on T2WI, with bilateral polygonal and wedge-shaped T2-hypointense areas, that show  no signs of abnormally restricted diffusion (PI-RADS 2).    11/21/24 - Doing well overall, here to review MRI. Urinary symptoms are stable. Has not started vesicare as of yet.          Lab Results   Component Value Date    PSA 5.40 (H) 10/26/2024    PSA 5.29 (H) 08/08/2024    PSA 3.43 05/02/2022    PSA 3.9 12/02/2021    PSA 4.0 09/09/2021              Current Medications:  Current Outpatient Medications   Medication Sig Dispense Refill    acetaminophen (Tylenol 8 HOUR) 650 mg ER tablet Take 1 tablet (650 mg) by mouth every 8 hours if needed for mild pain (1 - 3). Do not crush, chew, or split.      docusate sodium (Colace) 100 mg capsule Take 1 capsule (100 mg) by mouth 2 times a day. Hold for loose stools 30 capsule 0    fexofenadine (Allegra)  60 mg tablet Take 1 tablet (60 mg) by mouth once daily.      folic acid/multivit-min/lutein (CENTRUM SILVER ORAL) Take 1 tablet by mouth once daily.      ibuprofen 600 mg tablet Take 1 tablet (600 mg) by mouth 4 times a day as needed for mild pain (1 - 3) (pain). 90 tablet 5    pantoprazole (ProtoNix) 40 mg EC tablet Take 1 tablet (40 mg) by mouth once daily. Do not crush, chew, or split. 90 tablet 3    solifenacin (VESIcare) 5 mg tablet Take 1 tablet (5 mg) by mouth once daily. Swallow tablet whole; do not crush, chew, or split. 30 tablet 5     No current facility-administered medications for this visit.        Active Problems:  Duane O Reid is a 69 y.o. male with the following Problems and Medications.  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)    Adverse effect of radiation    Allergic reaction    Allergic rhinitis    Anemia    Benign essential hypertension    Bilateral impacted cerumen    Carpal tunnel syndrome    Chest pain    Chronic eczematous otitis externa of both ears    Dizziness    Dry ear canal    BPH (benign prostatic hyperplasia)    Enlarged prostate with lower urinary tract symptoms (LUTS)    Enlarged prostate without lower urinary tract symptoms (luts)    Epiretinal membrane (ERM) of left eye    Erectile dysfunction    Hearing loss    Herniated nucleus pulposus, L5-S1, left    Esophageal reflux    Hiatal hernia    Hypothyroidism    Hyperlipidemia    Leg swelling    Lower back pain    Lower urinary tract symptoms (LUTS)    Lump of ear    Mass of parotid gland    Nocturia    Overactive bladder    Prostatitis    Lung nodule, multiple    Pulmonary nodule    PVD (posterior vitreous detachment), both eyes    Scalp cyst    Sinusitis    Vitamin D deficiency    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    Obesity, Class I, BMI 30-34.9    BMI 45.0-49.9, adult (Multi)    Localized swelling, mass and lump, head    Vitamin B deficiency    Urge incontinence  of urine    Screening PSA (prostate specific antigen)    Abducens nerve palsy    Arthritis    Disease due to severe acute respiratory syndrome coronavirus 2 (SARS-CoV-2)    Disorder of bone    Familial hyperlipoproteinemia type IIa    Headache    History of hearing loss    Prediabetes    Urinary tract infection    Thyroid nodule    Observation for suspected cancer    Low libido    Low testosterone    Elevated PSA    Urgency of urination    Age-related nuclear cataract of both eyes    Myopia of both eyes with regular astigmatism and presbyopia     Current Outpatient Medications   Medication Sig Dispense Refill    acetaminophen (Tylenol 8 HOUR) 650 mg ER tablet Take 1 tablet (650 mg) by mouth every 8 hours if needed for mild pain (1 - 3). Do not crush, chew, or split.      docusate sodium (Colace) 100 mg capsule Take 1 capsule (100 mg) by mouth 2 times a day. Hold for loose stools 30 capsule 0    fexofenadine (Allegra) 60 mg tablet Take 1 tablet (60 mg) by mouth once daily.      folic acid/multivit-min/lutein (CENTRUM SILVER ORAL) Take 1 tablet by mouth once daily.      ibuprofen 600 mg tablet Take 1 tablet (600 mg) by mouth 4 times a day as needed for mild pain (1 - 3) (pain). 90 tablet 5    pantoprazole (ProtoNix) 40 mg EC tablet Take 1 tablet (40 mg) by mouth once daily. Do not crush, chew, or split. 90 tablet 3    solifenacin (VESIcare) 5 mg tablet Take 1 tablet (5 mg) by mouth once daily. Swallow tablet whole; do not crush, chew, or split. 30 tablet 5     No current facility-administered medications for this visit.       PMH:  Past Medical History:   Diagnosis Date    Anemia 09/01/2023    H/H 12/37.1    BPH (benign prostatic hyperplasia)     w/ LUTS    Chronic headaches     Constipation     Facial paralysis/[place] palsy 2009    resolved    GERD (gastroesophageal reflux disease)     Head and neck cancer (Multi) 2018    Adenoid cyst carcinoma, s/p resection x 2, chemo and radiation    Hearing aid worn     bilateral     High cholesterol     HL (hearing loss)     Lumbar disc herniation     L5-S1    Osteoarthritis     Other vitreous opacities, unspecified eye     Vitreous floaters    Pure hypercholesterolemia, unspecified 2013    Low-density-lipoid-type (LDL) hyperlipoproteinemia    Renal insufficiency     Sickle cell trait (CMS-HCC)     Sleep apnea     uses CPAP intermittently-doesn't like to use       PSH:  Past Surgical History:   Procedure Laterality Date    HEMORRHOID SURGERY  1980    KNEE ARTHROPLASTY Right 2018    SALIVARY GLAND SURGERY  2018    resection    SALIVARY GLAND SURGERY  2020    revision    TOTAL KNEE ARTHROPLASTY Left 2011       FMH:  Family History   Problem Relation Name Age of Onset    Eczema Mother          age 98    Diabetes type II Father           age 96    Arthritis Sister      Diabetes Brother      Addiction problem Half-Sister          overdose    No Known Problems Half-Sister         SHx:  Social History     Tobacco Use    Smoking status: Former     Current packs/day: 0.00     Average packs/day: 0.5 packs/day for 8.3 years (4.2 ttl pk-yrs)     Types: Cigarettes     Start date: 1971     Quit date:      Years since quittin.9     Passive exposure: Never    Smokeless tobacco: Never   Substance Use Topics    Alcohol use: Not Currently     Comment: RARELY-1-2 drinks a month    Drug use: Not Currently     Types: Marijuana     Comment: quit        Allergies:  Allergies   Allergen Reactions    Naproxen Other     Blood in stool    Niacin Other     Patient doesn't remember       Assessment/Plan  Doing well overall. We reviewed his MRI - no concerning lesions. Discussed the 10-15% false neg rate of MRI. I recommend continued PSA screening, will re-check in 6 months.       Follow up in 6 months with PSA    Scribe Attestation  By signing my name below, I, Litzy Robles, attest that this documentation has been prepared under the direction and in the presence of Miller Zavala MD.

## 2024-11-21 ENCOUNTER — OFFICE VISIT (OUTPATIENT)
Dept: UROLOGY | Facility: HOSPITAL | Age: 69
End: 2024-11-21
Payer: COMMERCIAL

## 2024-11-21 DIAGNOSIS — R97.20 ELEVATED PSA: Primary | ICD-10-CM

## 2024-11-21 PROCEDURE — 1157F ADVNC CARE PLAN IN RCRD: CPT | Performed by: UROLOGY

## 2024-11-21 PROCEDURE — 99213 OFFICE O/P EST LOW 20 MIN: CPT | Performed by: UROLOGY

## 2024-11-21 PROCEDURE — 1159F MED LIST DOCD IN RCRD: CPT | Performed by: UROLOGY

## 2024-11-21 PROCEDURE — 99417 PROLNG OP E/M EACH 15 MIN: CPT | Performed by: UROLOGY

## 2025-01-09 DIAGNOSIS — K21.9 GASTROESOPHAGEAL REFLUX DISEASE WITHOUT ESOPHAGITIS: ICD-10-CM

## 2025-01-09 RX ORDER — PANTOPRAZOLE SODIUM 40 MG/1
TABLET, DELAYED RELEASE ORAL
Qty: 90 TABLET | Refills: 2 | Status: SHIPPED | OUTPATIENT
Start: 2025-01-09

## 2025-01-23 ENCOUNTER — DOCUMENTATION (OUTPATIENT)
Dept: PHYSICAL THERAPY | Facility: CLINIC | Age: 70
End: 2025-01-23
Payer: COMMERCIAL

## 2025-01-23 NOTE — PROGRESS NOTES
Physical Therapy    Discharge Summary    Name: Duane O Reid  MRN: [id number]  : 1955  Date: 25    Discharge Summary: PT    Discharge Information:   The patient will be discharged at this time due to inactivity. The patient has not been seen for outpatient therapy in 30+ days and has not made contact to reschedule. The patient will require new referral/evaluation to resume therapy in the future.     The patient will be discharged at this time. Please refer to initial evaluation or re-assessment for last goals/objective measures assessment and progress report.    Thank you for this referral. For any questions on this patient’s course of therapy, please call the clinic for clarification.     Rehab Discharge Reason: Failed to schedule and/or keep follow-up appointment(s)

## 2025-03-19 ENCOUNTER — OFFICE VISIT (OUTPATIENT)
Dept: PRIMARY CARE | Facility: CLINIC | Age: 70
End: 2025-03-19
Payer: COMMERCIAL

## 2025-03-19 VITALS
SYSTOLIC BLOOD PRESSURE: 142 MMHG | HEIGHT: 66 IN | BODY MASS INDEX: 29.89 KG/M2 | DIASTOLIC BLOOD PRESSURE: 80 MMHG | WEIGHT: 186 LBS

## 2025-03-19 DIAGNOSIS — R07.9 CHEST PAIN, UNSPECIFIED TYPE: ICD-10-CM

## 2025-03-19 DIAGNOSIS — K21.9 GASTROESOPHAGEAL REFLUX DISEASE, UNSPECIFIED WHETHER ESOPHAGITIS PRESENT: ICD-10-CM

## 2025-03-19 DIAGNOSIS — C61 CA OF PROSTATE (MULTI): ICD-10-CM

## 2025-03-19 DIAGNOSIS — N32.81 OVERACTIVE BLADDER: ICD-10-CM

## 2025-03-19 DIAGNOSIS — C07 ADENOID CYSTIC CARCINOMA OF PAROTID GLAND (MULTI): ICD-10-CM

## 2025-03-19 DIAGNOSIS — I10 BENIGN ESSENTIAL HYPERTENSION: Primary | ICD-10-CM

## 2025-03-19 DIAGNOSIS — I20.0 UNSTABLE ANGINA PECTORIS (MULTI): ICD-10-CM

## 2025-03-19 PROCEDURE — 3079F DIAST BP 80-89 MM HG: CPT | Performed by: INTERNAL MEDICINE

## 2025-03-19 PROCEDURE — 1157F ADVNC CARE PLAN IN RCRD: CPT | Performed by: INTERNAL MEDICINE

## 2025-03-19 PROCEDURE — 3008F BODY MASS INDEX DOCD: CPT | Performed by: INTERNAL MEDICINE

## 2025-03-19 PROCEDURE — 99214 OFFICE O/P EST MOD 30 MIN: CPT | Performed by: INTERNAL MEDICINE

## 2025-03-19 PROCEDURE — G2211 COMPLEX E/M VISIT ADD ON: HCPCS | Performed by: INTERNAL MEDICINE

## 2025-03-19 PROCEDURE — 1159F MED LIST DOCD IN RCRD: CPT | Performed by: INTERNAL MEDICINE

## 2025-03-19 PROCEDURE — 3077F SYST BP >= 140 MM HG: CPT | Performed by: INTERNAL MEDICINE

## 2025-03-19 RX ORDER — NITROGLYCERIN 0.3 MG/1
0.6 TABLET SUBLINGUAL EVERY 5 MIN PRN
Qty: 60 TABLET | Refills: 11 | Status: SHIPPED | OUTPATIENT
Start: 2025-03-19 | End: 2026-03-19

## 2025-03-20 NOTE — PROGRESS NOTES
"Subjective   Patient ID: Duane O Reid is a 69 y.o. male who presents for Follow-up (Multiple medical issues).    This gentleman today came here for multiple medical issues.  1. He had two episodes when he had a chest pain very suggestive of angina.  His cardiologist ______ he has never had a stress test recently.  Weak, tired, fatigued, and exhausted.  2. His PSA number going high.  He is seeing urologist for that, but chest pain is main issue.  He came here for follow-up.  Dyspnea, angina equivalent.    I have personally reviewed the patient's Past Medical History, Medications, Allergies, Social History, and Family History in the EMR.    Review of Systems   All other systems reviewed and are negative.    Objective   /80   Ht 1.676 m (5' 6\")   Wt 84.4 kg (186 lb)   BMI 30.02 kg/m²     Physical Exam  Vitals reviewed.   Cardiovascular:      Heart sounds: Normal heart sounds, S1 normal and S2 normal. No murmur heard.     No friction rub.   Pulmonary:      Effort: Pulmonary effort is normal.      Breath sounds: Normal breath sounds and air entry.   Abdominal:      Palpations: There is no hepatomegaly, splenomegaly or mass.   Musculoskeletal:      Right lower leg: No edema.      Left lower leg: No edema.   Lymphadenopathy:      Lower Body: No right inguinal adenopathy. No left inguinal adenopathy.   Neurological:      Cranial Nerves: Cranial nerves 2-12 are intact.      Sensory: No sensory deficit.      Motor: Motor function is intact.      Deep Tendon Reflexes: Reflexes are normal and symmetric.     LAB WORK:  Laboratory testing discussed.    Assessment/Plan   Problem List Items Addressed This Visit             ICD-10-CM       Cardiac and Vasculature    Benign essential hypertension - Primary I10    Relevant Medications    nitroglycerin (Nitrostat) 0.3 mg SL tablet    Other Relevant Orders    CBC and Auto Differential    Comprehensive Metabolic Panel    Thyroid Stimulating Hormone    Troponin I, High " Sensitivity    Lipid Panel    Referral to Cardiology    Chest pain R07.9    Relevant Medications    nitroglycerin (Nitrostat) 0.3 mg SL tablet    Other Relevant Orders    CBC and Auto Differential    Comprehensive Metabolic Panel    Thyroid Stimulating Hormone    Troponin I, High Sensitivity    Lipid Panel    Referral to Cardiology       Gastrointestinal and Abdominal    Esophageal reflux K21.9       Genitourinary and Reproductive    Overactive bladder N32.81     Other Visit Diagnoses         Codes    CA of prostate (Multi)     C61        1. Chest pain, rule out MI.  Unfortunately I cannot do EKG.  Blood work ordered.  He is fasting, we will check troponin.  Refer for urgent appointment with Dr. Miller Ceron.  2. CA of prostate.  He is seeing a specialist.  3. ______ okay.  Nitroglycerine given.  Correct method explained.  4. GERD, on PPI.  5. Overactive bladder.  VESIcare.  6. I shall see him back in a couple of weeks.    Scribe Attestation  By signing my name below, IJanine Scribe attest that this documentation has been prepared under the direction and in the presence of Monica Parnell MD.         All medical record entries made by the scribe were personally dictated by me I have reviewed the chart and agree the record accurately reflects my personal performance of his history physical examination and management

## 2025-03-21 LAB
ALBUMIN SERPL-MCNC: 4.5 G/DL (ref 3.6–5.1)
ALP SERPL-CCNC: 60 U/L (ref 35–144)
ALT SERPL-CCNC: 11 U/L (ref 9–46)
ANION GAP SERPL CALCULATED.4IONS-SCNC: 17 MMOL/L (CALC) (ref 7–17)
AST SERPL-CCNC: 19 U/L (ref 10–35)
BASOPHILS # BLD AUTO: 7 CELLS/UL (ref 0–200)
BASOPHILS NFR BLD AUTO: 0.1 %
BILIRUB SERPL-MCNC: 1.1 MG/DL (ref 0.2–1.2)
BUN SERPL-MCNC: 10 MG/DL (ref 7–25)
CALCIUM SERPL-MCNC: 9.6 MG/DL (ref 8.6–10.3)
CHLORIDE SERPL-SCNC: 106 MMOL/L (ref 98–110)
CHOLEST SERPL-MCNC: 242 MG/DL
CHOLEST/HDLC SERPL: 4 (CALC)
CO2 SERPL-SCNC: 21 MMOL/L (ref 20–32)
CREAT SERPL-MCNC: 0.87 MG/DL (ref 0.7–1.28)
EGFRCR SERPLBLD CKD-EPI 2021: 92 ML/MIN/1.73M2
EOSINOPHIL # BLD AUTO: 0 CELLS/UL (ref 15–500)
EOSINOPHIL NFR BLD AUTO: 0 %
ERYTHROCYTE [DISTWIDTH] IN BLOOD BY AUTOMATED COUNT: 12.6 % (ref 11–15)
GLUCOSE SERPL-MCNC: 64 MG/DL (ref 65–99)
HCT VFR BLD AUTO: 37.3 % (ref 38.5–50)
HDLC SERPL-MCNC: 61 MG/DL
HGB BLD-MCNC: 12 G/DL (ref 13.2–17.1)
LDLC SERPL CALC-MCNC: 165 MG/DL (CALC)
LYMPHOCYTES # BLD AUTO: 248 CELLS/UL (ref 850–3900)
LYMPHOCYTES NFR BLD AUTO: 3.4 %
MCH RBC QN AUTO: 28 PG (ref 27–33)
MCHC RBC AUTO-ENTMCNC: 32.2 G/DL (ref 32–36)
MCV RBC AUTO: 87.1 FL (ref 80–100)
MONOCYTES # BLD AUTO: 73 CELLS/UL (ref 200–950)
MONOCYTES NFR BLD AUTO: 1 %
NEUTROPHILS # BLD AUTO: 6972 CELLS/UL (ref 1500–7800)
NEUTROPHILS NFR BLD AUTO: 95.5 %
NONHDLC SERPL-MCNC: 181 MG/DL (CALC)
PLATELET # BLD AUTO: 279 THOUSAND/UL (ref 140–400)
PMV BLD REES-ECKER: 9.7 FL (ref 7.5–12.5)
POTASSIUM SERPL-SCNC: 3.9 MMOL/L (ref 3.5–5.3)
PROT SERPL-MCNC: 6.8 G/DL (ref 6.1–8.1)
RBC # BLD AUTO: 4.28 MILLION/UL (ref 4.2–5.8)
SODIUM SERPL-SCNC: 144 MMOL/L (ref 135–146)
TRIGL SERPL-MCNC: 68 MG/DL
TSH SERPL-ACNC: 0.64 MIU/L (ref 0.4–4.5)
WBC # BLD AUTO: 7.3 THOUSAND/UL (ref 3.8–10.8)

## 2025-03-24 ENCOUNTER — OFFICE VISIT (OUTPATIENT)
Dept: CARDIOLOGY | Facility: HOSPITAL | Age: 70
End: 2025-03-24
Payer: COMMERCIAL

## 2025-03-24 VITALS
HEART RATE: 70 BPM | BODY MASS INDEX: 29.66 KG/M2 | DIASTOLIC BLOOD PRESSURE: 80 MMHG | HEIGHT: 67 IN | SYSTOLIC BLOOD PRESSURE: 145 MMHG | WEIGHT: 189 LBS

## 2025-03-24 DIAGNOSIS — E78.5 HYPERLIPIDEMIA, UNSPECIFIED HYPERLIPIDEMIA TYPE: ICD-10-CM

## 2025-03-24 DIAGNOSIS — I10 BENIGN ESSENTIAL HYPERTENSION: ICD-10-CM

## 2025-03-24 DIAGNOSIS — I20.0 UNSTABLE ANGINA PECTORIS (MULTI): ICD-10-CM

## 2025-03-24 DIAGNOSIS — R06.02 SHORTNESS OF BREATH: ICD-10-CM

## 2025-03-24 PROCEDURE — 3077F SYST BP >= 140 MM HG: CPT | Performed by: INTERNAL MEDICINE

## 2025-03-24 PROCEDURE — 1157F ADVNC CARE PLAN IN RCRD: CPT | Performed by: INTERNAL MEDICINE

## 2025-03-24 PROCEDURE — 3008F BODY MASS INDEX DOCD: CPT | Performed by: INTERNAL MEDICINE

## 2025-03-24 PROCEDURE — 99214 OFFICE O/P EST MOD 30 MIN: CPT | Mod: 25 | Performed by: INTERNAL MEDICINE

## 2025-03-24 PROCEDURE — 93005 ELECTROCARDIOGRAM TRACING: CPT | Performed by: INTERNAL MEDICINE

## 2025-03-24 PROCEDURE — 1036F TOBACCO NON-USER: CPT | Performed by: INTERNAL MEDICINE

## 2025-03-24 PROCEDURE — 1159F MED LIST DOCD IN RCRD: CPT | Performed by: INTERNAL MEDICINE

## 2025-03-24 PROCEDURE — 99204 OFFICE O/P NEW MOD 45 MIN: CPT | Performed by: INTERNAL MEDICINE

## 2025-03-24 PROCEDURE — 3079F DIAST BP 80-89 MM HG: CPT | Performed by: INTERNAL MEDICINE

## 2025-03-24 PROCEDURE — 1160F RVW MEDS BY RX/DR IN RCRD: CPT | Performed by: INTERNAL MEDICINE

## 2025-03-24 PROCEDURE — 93010 ELECTROCARDIOGRAM REPORT: CPT | Performed by: INTERNAL MEDICINE

## 2025-03-24 RX ORDER — ATORVASTATIN CALCIUM 20 MG/1
20 TABLET, FILM COATED ORAL DAILY
Qty: 90 TABLET | Refills: 3 | Status: SHIPPED | OUTPATIENT
Start: 2025-03-24 | End: 2026-03-24

## 2025-03-24 RX ORDER — ASPIRIN 81 MG/1
81 TABLET ORAL DAILY
COMMUNITY

## 2025-03-24 NOTE — PROGRESS NOTES
Chief complaint:  I am seeing patient in consultation for Dr. Parnell regarding chest discomfort.    Patient is a pleasant 69-year-old gentleman.  He denies any history of hypertension or diabetes.  He does have a known elevated cholesterol level but has not been on treatment for some time.  He was a smoker but quit many decades ago.  There is no clear family history of premature coronary disease.    Patient denies any prior history of MI or stroke.  There is no history of rheumatic fever or history of heart murmur.  He denies any sustained palpitations, presyncope or syncope.    He has been noticing some increasing exertional dyspnea for nearly a year.  His wife has noticed this probably more that he has.  He appears to have cut back his activity level a bit because of this.  He denies any rest symptomatology and denies any severe prolonged symptoms.    He denies any clear exertionally mediated chest discomfort or claudication.  He does have some limited peripheral edema.    He was moving a patient around in a wheelchair several weeks ago.  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  He did have to struggle a bit to move the wheelchair this time, and henoticed some bilateral chest and arm discomfort which only lasted a few minutes.  He has never had any similar discomfort and has not had any recurrent episodes since that time.  He comes in today for further evaluation.  Of note, patient did have a coronary calcium scan done about 3 years ago and this was minimally elevated at 2.38.    He denies any other cardiac complaints at this time.    PMH/PSH:  Past medical history is remarkable for the hypercholesterolemia.  He also has some GERD as well as arthritis.  He does have some knee arthritis as well as problematic shoulder arthritis.  He also appears to have sleep apnea but is not particularly compliant with his CPAP therapy.  He denies any other chronic medical illnesses.  Allergies:  Naproxen/niacin  EtOH: Rare use.  Caffeine: Limited use.    FH/SH:  Patient is a 69-year-old  gentleman.  His wife of 44 years accompanies him today.  He has 5 children and 11 grandchildren.  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  Other than that, he admits to being rather sedentary.    ROS:  All other systems have been reviewed and are negative for complaint.    Physical exam:  Vital signs:  P-70   BP-145/80 (blood pressure was checked in both arms and was equal bilaterally at 145/80).  General: Alert, pleasant middle-age male in no obvious distress.  HEENT: Arcus senilis with normal EOMs but no thyromegaly or lymphadenopathy.  Lungs: Generally clear without tima crackles or wheezing.  Cardiac: Normal JVP and carotid upstrokes without bruit.  There is a normal S1 with a 2/ 6 systolic murmur and a normal S2.  I can appreciate no diastolic murmur, rumble or rub.  Abdomen: Nontender with normal bowel sounds and no bruits.  Extremities: Mild ankle edema.  Skin: No acute rash.  Neuro: Grossly intact and unchanged.  Vascular: Normal brachial, radial and ulnar pulses bilaterally.  Normal popliteal and distal pulses bilaterally.    EKG: Normal sinus rhythm.  Right atrial enlargement.  LVH.  No acute changes.    Lipid panel: Cholesterol-242, HDL-61, LDL-165, TG-68.    Impression/plan:  Duane presents with an episode of chest discomfort several weeks ago which certainly appears to be most likely musculoskeletal in origin.  I reassured him in regards to this and told him that I did not think we needed to jump into an ischemic workup at this time.    Duane does present with some exertional dyspnea of concern in the setting of some possible COPD from his remote smoking history.  He also has some sleep apnea which is not optimally treated.  He also has a heart murmur on exam.  Given this, I will plan on setting him up for an echocardiogram just to sort out if we are dealing with any  concerning valve disease or potential right sided heart disease.  I will review these results with him when they are available and make further recommendations as needed.  Pending the results of the echo, I will sort out whether we potentially want to consider an ischemic workup as well although I was pleased to see that his coronary calcium score was only minimally elevated at 2.38 when checked 3 years ago.    His blood pressure appears to be in reasonable range at this time, so I did not think we needed to initiate any antihypertensive therapy at this time.    I did tell him given the degree of his LDL elevation that we did want to get him reinitiated back on lipid-lowering therapy.  I will start him on a atorvastatin at 20 mg daily and will hopefully get him to tolerate this long-term.  I will see him back in 2 months, and we will reassess his lipid panel at that time just before he returns.  He knows to call should he have any problems tolerating his low-dose atorvastatin regimen.    Patient instructions:    Begin atorvastatin at 20 mg daily.    Continue other medications unchanged.    Call for any intercurrent problems.    Return to clinic in 2 months.    Obtain repeat fasting lipid panel just before your next visit.

## 2025-03-25 LAB
ATRIAL RATE: 70 BPM
P AXIS: 50 DEGREES
P OFFSET: 205 MS
P ONSET: 138 MS
PR INTERVAL: 158 MS
Q ONSET: 217 MS
QRS COUNT: 12 BEATS
QRS DURATION: 96 MS
QT INTERVAL: 374 MS
QTC CALCULATION(BAZETT): 403 MS
QTC FREDERICIA: 393 MS
R AXIS: 56 DEGREES
T AXIS: 59 DEGREES
T OFFSET: 404 MS
VENTRICULAR RATE: 70 BPM

## 2025-04-08 ENCOUNTER — APPOINTMENT (OUTPATIENT)
Dept: CARDIOLOGY | Facility: HOSPITAL | Age: 70
End: 2025-04-08
Payer: COMMERCIAL

## 2025-04-18 ENCOUNTER — TELEPHONE (OUTPATIENT)
Dept: OTOLARYNGOLOGY | Facility: HOSPITAL | Age: 70
End: 2025-04-18
Payer: COMMERCIAL

## 2025-04-18 NOTE — TELEPHONE ENCOUNTER
Patient called in saying he is having some swelling at his previous surgical site on the Right. Sometimes he is getting a pain like he is sucking on something really sour. Feels a lump under his R jaw. Not sure if this could be a lymph node or maybe something salivary instead. Will discuss with Dr. Abdalla to see if she wants scans and then will make an appointment.  Will update patient once I hear back.

## 2025-04-21 ENCOUNTER — TELEPHONE (OUTPATIENT)
Dept: OTOLARYNGOLOGY | Facility: CLINIC | Age: 70
End: 2025-04-21
Payer: COMMERCIAL

## 2025-04-21 NOTE — TELEPHONE ENCOUNTER
Called in response to patient message regarding right facial/neck swelling.  Didn't reach him, no answer.  Left VM.      My nurse Sera reached out yesterday while I was in surgery.  Ordered CT neck w/contrast and added on for office visit for my exam    Krysta Abdalla MD    Head & Neck Surgical Oncology & Reconstruction  Department of Otolaryngology - Head and Neck Surgery

## 2025-04-22 ENCOUNTER — PATIENT MESSAGE (OUTPATIENT)
Dept: OTOLARYNGOLOGY | Facility: HOSPITAL | Age: 70
End: 2025-04-22
Payer: COMMERCIAL

## 2025-04-22 DIAGNOSIS — C07 ADENOID CYSTIC CARCINOMA OF PAROTID GLAND: ICD-10-CM

## 2025-05-06 ENCOUNTER — LAB (OUTPATIENT)
Dept: LAB | Facility: HOSPITAL | Age: 70
End: 2025-05-06
Payer: COMMERCIAL

## 2025-05-06 DIAGNOSIS — C07 MALIGNANT NEOPLASM OF PAROTID GLAND (MULTI): Primary | ICD-10-CM

## 2025-05-06 LAB
ALBUMIN SERPL BCP-MCNC: 4.2 G/DL (ref 3.4–5)
ANION GAP SERPL CALC-SCNC: 12 MMOL/L (ref 10–20)
BUN SERPL-MCNC: 13 MG/DL (ref 6–23)
CALCIUM SERPL-MCNC: 9.4 MG/DL (ref 8.6–10.6)
CHLORIDE SERPL-SCNC: 107 MMOL/L (ref 98–107)
CO2 SERPL-SCNC: 29 MMOL/L (ref 21–32)
CREAT SERPL-MCNC: 0.92 MG/DL (ref 0.5–1.3)
EGFRCR SERPLBLD CKD-EPI 2021: 89 ML/MIN/1.73M*2
GLUCOSE SERPL-MCNC: 83 MG/DL (ref 74–99)
PHOSPHATE SERPL-MCNC: 3.2 MG/DL (ref 2.5–4.9)
POTASSIUM SERPL-SCNC: 3.9 MMOL/L (ref 3.5–5.3)
SODIUM SERPL-SCNC: 144 MMOL/L (ref 136–145)

## 2025-05-06 PROCEDURE — 80069 RENAL FUNCTION PANEL: CPT

## 2025-05-06 ASSESSMENT — ENCOUNTER SYMPTOMS
VOICE CHANGE: 0
VOMITING: 0
TROUBLE SWALLOWING: 0
FEVER: 0
NAUSEA: 0
FATIGUE: 0
UNEXPECTED WEIGHT CHANGE: 0
SORE THROAT: 0
NECK PAIN: 1
APPETITE CHANGE: 0
COUGH: 0
ADENOPATHY: 0
STRIDOR: 0
SHORTNESS OF BREATH: 0
CHILLS: 0
FACIAL SWELLING: 0

## 2025-05-06 NOTE — PROGRESS NOTES
"Cancer follow up  TSH: Due 6/25   Chest: Due 8/25    Chief Complaint   Patient presents with   • Follow-up     Right side of neck review.     HPI:  Duane O Reid is a 69 y.o. male following up with me today for his recurrent right parotid adenoid cystic carcinoma. Last seen 7/24. Patient is s/p revision right skin nodules, right inferior parotidectomy without dissection of the facial nerve, excision of right neck skin on 11/6/20. Completed adjuvant proton radiation with Dr. Acharya on 2/15/21. He has noticed some facial/neck swelling as well as pain. CT was ordered and obtained 5/8/25 which I personally reviewed.  This is negative for any recurrent masses or lesions.  Some scar tissue but no parotid recurrence.  He reports when he turns his head to the left side it will \"freeze up\" on his right and then he has a lump and difficulty turning it back to the right again.  He is able to massage and turn it back after a few seconds.  He has tried hydration.  The tingling and sour taste are no longer happening.  He is also having some aching from his left knee replacement and some cholesterol issues with his heart that he is following with cardiology.    History:  Dx: Right parotid adenoid cystic carcinoma 2018  Dx#2: Recurrent right parotid adenoid cystic carcinoma 2020  6/12/18 CT neck with contrast: negative for visible mass within the right parotid, no pathologic lymphadenopathy.   6/22/18 MRI +[id number] nodule within the right parotid. This is well encapsulated.  7/25/18: US guided FNA +adenoid cystic carcinoma   8/31/18: S/p right total parotidectomy. Final path +adenoid cystic carcinoma, near margin  3/4/19: MRI enhancement within the right parotid bed but no evidence of discrete parotid mass  6/26/19: No clinical evidence of recurrence  9/11/19: MRI decreased enhancement of the right parotid bed, no recurrence, no FN enhancement  9/8/20: MRI decreased enhancement within the right parotid bed. There are two small " enhancing nodules within the right neck subcutaneous fat which have increased in size compared to prior MRI.   9/9/20: FNA of R neck mass. Path + adenoid cystic carcinoma   10/14/20: CT chest w/o contrast stable 4mm right lung nodule.   11/6/20: S/p revision right parotidectomy without dissection of the facial nerve, excision of right neck skin. Path revealed recurrent adenoid cystic carcinoma. This measured 1.2cm in size. There was +perineural involvement. The skin over the area where the nodule was located was resected and there was adenoid cystic carcinoma found within the skin with the perineural involvement.  11/20/20: Tumor board recommendation adjuvant radiation   2/15/21: Scheduled to finish radiation with Dr. Acharya   5/13/21: 3 month post treatment PET negative.  7/21: +lymphedema, mild  5/2/22: TSH normal  5/16/22: CT neck and chest w/o contrast- negative   1/23: TSH normal  5/23: CT neck and chest negative for recurrence or new masses.  9/23: TSH normal  2/24: Neck pain/swelling with traveling, improved since he's been home  7/31: Stable right trismus (since 2020)  5/8/25: CT neck negative  5/25: Right neck muscle spasms jhony when turning head to the left, rec PT, discussed muscle relaxants but not interested     SH:  Tob: 3/4ppd, quit 1990s, h/o marijuana quit 1994  ETOH: 4x/week    ROS:  Review of Systems   Constitutional:  Negative for appetite change, chills, fatigue, fever and unexpected weight change.   HENT:  Negative for dental problem, drooling, ear pain, facial swelling, hearing loss, mouth sores, sore throat, tinnitus, trouble swallowing and voice change.    Respiratory:  Negative for cough, shortness of breath and stridor.    Gastrointestinal:  Negative for nausea and vomiting.   Musculoskeletal:  Positive for neck pain and neck stiffness.   Hematological:  Negative for adenopathy.   All other systems reviewed and are negative.       PE:  ENT Physical Exam  Constitutional  Appearance: patient  appears well-developed and well-groomed, patient is cooperative;  Communication/Voice: communication appropriate for developmental age;  Head and Face  Appearance: head appears normal and face appears normal;  Salivary: Salivary comments: s/p right parotidectomy, scar is well healed.  No recurrent masses or lesions.  Ear  Hearing: impaired to conversational voice (HA removed);  Auricles: right auricle normal; left auricle normal;  Ear Canals: right ear canal normal; left ear canal normal;  Tympanic Membranes: right tympanic membrane normal; left tympanic membrane normal;  Ear comments: Right EAC is smaller than the left.  There is a small amount of cerumen but not impacted  Nose  External Nose: nares patent bilaterally; external nose normal;  Internal Nose: nasal mucosa normal; septum normal;  Oral Cavity/Oropharynx  Lips: normal;  Gums: gingiva normal;  Tongue: normal;  Oral mucosa: normal;  OC/OP comments: OP clear, no masses or lesions  Neck  Neck: scars (healed right parotidectomy scar) and radiation changes present;  Thyroid: thyroid normal;  Neck comments: +right neck fibrosis, when turning to the left there is prominence of the left inferior parotid without nodule or mass.  There is no   Respiratory  Inspection: breathing unlabored;  Cardiovascular  Inspection: extremities are warm and well perfused;  Lymphatic  Palpation: no cervical adenopathy noted;  Neurovestibular  Mental Status: alert and oriented;  Psychiatric: mood normal; affect is appropriate;  Cranial Nerves: cranial nerves intact; no facial weakness noted;       Procedures     ASSESSMENT AND PLAN:  Problem List Items Addressed This Visit    None         Krysta Abdalla MD    Head & Neck Surgical Oncology & Reconstruction  Department of Otolaryngology - Head and Neck Surgery     By signing my name below, Shikha OATES Scribe, attest that this documentation has been prepared under the direction and in the presence of Dr. Krysta Abdalla MD.      All medical record entries made by the Scribe were at my direction and personally dictated by me, Dr. Krysta Abdalla. I have reviewed the chart and agree that the record accurately reflects my personal performance of the history, physical exam, discussion and plan.

## 2025-05-08 ENCOUNTER — HOSPITAL ENCOUNTER (OUTPATIENT)
Dept: RADIOLOGY | Facility: CLINIC | Age: 70
Discharge: HOME | End: 2025-05-08
Payer: MEDICARE

## 2025-05-08 DIAGNOSIS — C07 ADENOID CYSTIC CARCINOMA OF PAROTID GLAND: ICD-10-CM

## 2025-05-08 PROCEDURE — 2550000001 HC RX 255 CONTRASTS: Performed by: OTOLARYNGOLOGY

## 2025-05-08 PROCEDURE — 70491 CT SOFT TISSUE NECK W/DYE: CPT

## 2025-05-08 PROCEDURE — 70491 CT SOFT TISSUE NECK W/DYE: CPT | Performed by: RADIOLOGY

## 2025-05-08 RX ADMIN — IOHEXOL 75 ML: 350 INJECTION, SOLUTION INTRAVENOUS at 09:41

## 2025-05-09 ENCOUNTER — PATIENT MESSAGE (OUTPATIENT)
Dept: OTOLARYNGOLOGY | Facility: HOSPITAL | Age: 70
End: 2025-05-09
Payer: COMMERCIAL

## 2025-05-14 ENCOUNTER — OFFICE VISIT (OUTPATIENT)
Dept: OTOLARYNGOLOGY | Facility: HOSPITAL | Age: 70
End: 2025-05-14
Payer: COMMERCIAL

## 2025-05-14 VITALS — HEIGHT: 67 IN | BODY MASS INDEX: 29.03 KG/M2 | TEMPERATURE: 98.2 F | WEIGHT: 185 LBS

## 2025-05-14 DIAGNOSIS — M62.838 MUSCLE SPASM: ICD-10-CM
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
## 2025-05-14 DIAGNOSIS — E03.9 ACQUIRED HYPOTHYROIDISM: ICD-10-CM

## 2025-05-14 DIAGNOSIS — K11.8 MASS OF PAROTID GLAND: Primary | ICD-10-CM

## 2025-05-14 DIAGNOSIS — M62.838 MUSCLE SPASMS OF NECK: ICD-10-CM

## 2025-05-14 DIAGNOSIS — C07 ADENOID CYSTIC CARCINOMA OF PAROTID GLAND: ICD-10-CM

## 2025-05-14 DIAGNOSIS — T66.XXXD ADVERSE EFFECT OF RADIATION, SUBSEQUENT ENCOUNTER: ICD-10-CM

## 2025-05-14 DIAGNOSIS — K11.7 XEROSTOMIA DUE TO RADIOTHERAPY: ICD-10-CM

## 2025-05-14 PROCEDURE — 1126F AMNT PAIN NOTED NONE PRSNT: CPT | Performed by: OTOLARYNGOLOGY

## 2025-05-14 PROCEDURE — 3008F BODY MASS INDEX DOCD: CPT | Performed by: OTOLARYNGOLOGY

## 2025-05-14 PROCEDURE — 99214 OFFICE O/P EST MOD 30 MIN: CPT | Performed by: OTOLARYNGOLOGY

## 2025-05-14 PROCEDURE — 1160F RVW MEDS BY RX/DR IN RCRD: CPT | Performed by: OTOLARYNGOLOGY

## 2025-05-14 PROCEDURE — 1159F MED LIST DOCD IN RCRD: CPT | Performed by: OTOLARYNGOLOGY

## 2025-05-14 ASSESSMENT — PATIENT HEALTH QUESTIONNAIRE - PHQ9
SUM OF ALL RESPONSES TO PHQ9 QUESTIONS 1 & 2: 0
1. LITTLE INTEREST OR PLEASURE IN DOING THINGS: NOT AT ALL
2. FEELING DOWN, DEPRESSED OR HOPELESS: NOT AT ALL

## 2025-05-14 ASSESSMENT — PAIN SCALES - GENERAL: PAINLEVEL_OUTOF10: 0-NO PAIN

## 2025-05-14 ASSESSMENT — ENCOUNTER SYMPTOMS: NECK STIFFNESS: 1

## 2025-05-15 ENCOUNTER — APPOINTMENT (OUTPATIENT)
Dept: UROLOGY | Facility: HOSPITAL | Age: 70
End: 2025-05-15
Payer: COMMERCIAL

## 2025-05-21 DIAGNOSIS — T66.XXXD ADVERSE EFFECT OF RADIATION, SUBSEQUENT ENCOUNTER: ICD-10-CM

## 2025-05-23 ENCOUNTER — APPOINTMENT (OUTPATIENT)
Dept: UROLOGY | Facility: HOSPITAL | Age: 70
End: 2025-05-23
Payer: COMMERCIAL

## 2025-05-26 PROBLEM — M62.838 MUSCLE SPASMS OF NECK: Status: ACTIVE | Noted: 2025-05-26

## 2025-05-26 NOTE — ASSESSMENT & PLAN NOTE
Chronic, mild  Recommend conservative treatment  
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  
No evidence of disease  Personally reviewed CT neck which was negative  Follow up in 3-4 months, discussed transition to new H&N surgeon at that time  
Swelling of the parotid gland  Based on history this could have been acute on chronic sialadenitis however recent CT scan personally reviewed was negative for masses or lesions  Reassurance provided  This has improved  
No

## 2025-05-27 NOTE — PROGRESS NOTES
HPI    69 y.o. male being seen with the following problem list:    Problem list:  BPH and poorly controlled LUTS s/p outpatient HolEP + 100U botox 1/30/24. Path 16g, benign.     2/1/24 - seen for TOV, 200cc in, 225cc out. Urine clear, no issues     2/21/24 - seen for PVR. PVR 44cc. Patient reports an improved stream, improved nocturia. He has no leakage.     05/22/24 - PVR 3cc.  Stream is strong.  No leakage.  No urgency or frequency.  reports of some spraying of the stream, but for the most part doing well.  His brother was diagnosed with prostate cancer.  Overall fairly happy with his outcome     09/19/24 - seen over [facility] for symptom check. Still having some urinary frequency, has not gotten worse. Splaying of stream has resolved. Wants to observe his frequency for now. Also reports low T level, thinks his energy is ok, but libido isnt great.      6/2024 T 249     10/31/24 - PVR 31cc. Doing well overall, voiding well, some frequency, nocturia 2-3x. No prostate bx before. Would like to try a med for urgency and frequency.     11/13/24 MRI prostate  The peripheral zone is diffusely heterogenous on T2WI, with bilateral polygonal and wedge-shaped T2-hypointense areas, that show  no signs of abnormally restricted diffusion (PI-RADS 2).     11/21/24 - Doing well overall, here to review MRI. Urinary symptoms are stable. Has not started vesicare as of yet.    05/27/25 - Seen today over telehealth, performed this visit using real-time telehealth tools, including an audio/video connection between Duane Reid at home and Miller Zavala MD at [facility]. Consent for telemedicine visit was obtained.   Here to review PSA. ***        Lab Results   Component Value Date    PSA 5.40 (H) 10/26/2024    PSA 5.29 (H) 08/08/2024    PSA 3.43 05/02/2022    PSA 3.9 12/02/2021    PSA 4.0 09/09/2021              Current Medications:  Current Medications[1]     Active Problems:  Duane O Reid is a 69 y.o. male with the following  Problems and Medications.  Problem List[2]  Current Medications[3]    PMH:  Medical History[4]    PSH:  Surgical History[5]    FMH:  Family History[6]    SHx:  Social History[7]    Allergies:  RX Allergies[8]    Physical Exam:      Assessment/Plan      Scribe Attestation  By signing my name below, I, Charley Marshall, Scribe, attest that this documentation has been prepared under the direction and in the presence of Miller Zavala MD.         [1]   Current Outpatient Medications   Medication Sig Dispense Refill    acetaminophen (Tylenol 8 HOUR) 650 mg ER tablet Take 1 tablet (650 mg) by mouth every 8 hours if needed for mild pain (1 - 3). Do not crush, chew, or split.      aspirin 81 mg EC tablet Take 1 tablet (81 mg) by mouth once daily.      atorvastatin (Lipitor) 20 mg tablet Take 1 tablet (20 mg) by mouth once daily. 90 tablet 3    docusate sodium (Colace) 100 mg capsule Take 1 capsule (100 mg) by mouth 2 times a day. Hold for loose stools 30 capsule 0    fexofenadine (Allegra) 60 mg tablet Take 1 tablet (60 mg) by mouth once daily.      ibuprofen 600 mg tablet Take 1 tablet (600 mg) by mouth 4 times a day as needed for mild pain (1 - 3) (pain). 90 tablet 5    nitroglycerin (Nitrostat) 0.3 mg SL tablet Place 2 tablets (0.6 mg) under the tongue every 5 minutes if needed for chest pain. May repeat every 5 minutes for up to 3 doses. 60 tablet 11    pantoprazole (ProtoNix) 40 mg EC tablet TAKE 1 TABLET ONCE DAILY - do not crush, chew, or split 90 tablet 2     No current facility-administered medications for this visit.   [2]   Patient Active Problem List  Diagnosis    Abnormal EKG    Adenoid cystic carcinoma of parotid gland    Adverse effect of radiation    Allergic reaction    Allergic rhinitis    Anemia    Benign essential hypertension    Bilateral impacted cerumen    Carpal tunnel syndrome    Chest pain    Chronic eczematous otitis externa of both ears    Dizziness    Dry ear canal    BPH (benign prostatic  hyperplasia)    Enlarged prostate with lower urinary tract symptoms (LUTS)    Enlarged prostate without lower urinary tract symptoms (luts)    Epiretinal membrane (ERM) of left eye    Erectile dysfunction    Hearing loss    Herniated nucleus pulposus, L5-S1, left    Esophageal reflux    Hiatal hernia    Hypothyroidism    Hyperlipidemia    Leg swelling    Lower back pain    Lower urinary tract symptoms (LUTS)    Lump of ear    Mass of parotid gland    Nocturia    Overactive bladder    Prostatitis    Lung nodule, multiple    Pulmonary nodule    PVD (posterior vitreous detachment), both eyes    Scalp cyst    Sinusitis    Vitamin D deficiency    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    Obesity, Class I, BMI 30-34.9    BMI 45.0-49.9, adult (Multi)    Localized swelling, mass and lump, head    Vitamin B deficiency    Urge incontinence of urine    Screening PSA (prostate specific antigen)    Abducens nerve palsy    Arthritis    Disease due to severe acute respiratory syndrome coronavirus 2 (SARS-CoV-2)    Disorder of bone    Familial hyperlipoproteinemia type IIa    Headache    History of hearing loss    Prediabetes    Urinary tract infection    Thyroid nodule    Observation for suspected cancer    Low libido    Low testosterone    Elevated PSA    Urgency of urination    Age-related nuclear cataract of both eyes    Myopia of both eyes with regular astigmatism and presbyopia    Muscle spasms of neck   [3]   Current Outpatient Medications   Medication Sig Dispense Refill    acetaminophen (Tylenol 8 HOUR) 650 mg ER tablet Take 1 tablet (650 mg) by mouth every 8 hours if needed for mild pain (1 - 3). Do not crush, chew, or split.      aspirin 81 mg EC tablet Take 1 tablet (81 mg) by mouth once daily.      atorvastatin (Lipitor) 20 mg tablet Take 1 tablet (20 mg) by mouth once daily. 90 tablet 3    docusate sodium (Colace) 100 mg capsule Take 1 capsule (100 mg) by mouth 2 times a day. Hold for loose stools 30 capsule 0    fexofenadine  (Allegra) 60 mg tablet Take 1 tablet (60 mg) by mouth once daily.      ibuprofen 600 mg tablet Take 1 tablet (600 mg) by mouth 4 times a day as needed for mild pain (1 - 3) (pain). 90 tablet 5    nitroglycerin (Nitrostat) 0.3 mg SL tablet Place 2 tablets (0.6 mg) under the tongue every 5 minutes if needed for chest pain. May repeat every 5 minutes for up to 3 doses. 60 tablet 11    pantoprazole (ProtoNix) 40 mg EC tablet TAKE 1 TABLET ONCE DAILY - do not crush, chew, or split 90 tablet 2     No current facility-administered medications for this visit.   [4]   Past Medical History:  Diagnosis Date    Anemia 2023    H/H .1    BPH (benign prostatic hyperplasia)     w/ LUTS    Chronic headaches     Constipation     Facial paralysis/[place] palsy     resolved    GERD (gastroesophageal reflux disease)     Head and neck cancer (Multi) 2018    Adenoid cyst carcinoma, s/p resection x 2, chemo and radiation    Hearing aid worn     bilateral    High cholesterol     HL (hearing loss)     Lumbar disc herniation     L5-S1    Osteoarthritis     Other vitreous opacities, unspecified eye     Vitreous floaters    Pure hypercholesterolemia, unspecified 2013    Low-density-lipoid-type (LDL) hyperlipoproteinemia    Renal insufficiency     Sickle cell trait     Sleep apnea     uses CPAP intermittently-doesn't like to use   [5]   Past Surgical History:  Procedure Laterality Date    HEMORRHOID SURGERY  1980    KNEE ARTHROPLASTY Right 2018    SALIVARY GLAND SURGERY  2018    resection    SALIVARY GLAND SURGERY  2020    revision    TOTAL KNEE ARTHROPLASTY Left    [6]   Family History  Problem Relation Name Age of Onset    Eczema Mother          age 98    Diabetes type II Father           age 96    Arthritis Sister      Diabetes Brother      Addiction problem Half-Sister          overdose    No Known Problems Half-Sister     [7]   Social History  Tobacco Use    Smoking status: Former     Current packs/day: 0.00      Average packs/day: 0.5 packs/day for 8.3 years (4.2 ttl pk-yrs)     Types: Cigarettes     Start date: 1971     Quit date:      Years since quittin.4     Passive exposure: Never    Smokeless tobacco: Never   Substance Use Topics    Alcohol use: Not Currently     Comment: RARELY-1-2 drinks a month    Drug use: Not Currently     Types: Marijuana     Comment: quit    [8]   Allergies  Allergen Reactions    Naproxen Other     Blood in stool    Niacin Other     Patient doesn't remember

## 2025-05-28 ENCOUNTER — TELEMEDICINE (OUTPATIENT)
Dept: UROLOGY | Facility: HOSPITAL | Age: 70
End: 2025-05-28
Payer: COMMERCIAL

## 2025-05-28 DIAGNOSIS — R97.20 ELEVATED PSA: Primary | ICD-10-CM

## 2025-06-06 LAB — PSA SERPL-MCNC: 5.32 NG/ML

## 2025-06-15 LAB
ALT SERPL-CCNC: 15 U/L (ref 9–46)
AST SERPL-CCNC: 17 U/L (ref 10–35)
CHOLEST SERPL-MCNC: 140 MG/DL
CHOLEST/HDLC SERPL: 2.5 (CALC)
HDLC SERPL-MCNC: 56 MG/DL
LDLC SERPL CALC-MCNC: 69 MG/DL (CALC)
LPA SERPL-SCNC: NORMAL NMOL/L
NONHDLC SERPL-MCNC: 84 MG/DL (CALC)
TRIGL SERPL-MCNC: 72 MG/DL

## 2025-06-17 PROBLEM — G56.00 CARPAL TUNNEL SYNDROME: Status: RESOLVED | Noted: 2023-05-30 | Resolved: 2025-06-17

## 2025-06-17 LAB
ALT SERPL-CCNC: 15 U/L (ref 9–46)
AST SERPL-CCNC: 17 U/L (ref 10–35)
CHOLEST SERPL-MCNC: 140 MG/DL
CHOLEST/HDLC SERPL: 2.5 (CALC)
HDLC SERPL-MCNC: 56 MG/DL
LDLC SERPL CALC-MCNC: 69 MG/DL (CALC)
LPA SERPL-SCNC: 39 NMOL/L
NONHDLC SERPL-MCNC: 84 MG/DL (CALC)
TRIGL SERPL-MCNC: 72 MG/DL

## 2025-06-23 ENCOUNTER — HOSPITAL ENCOUNTER (OUTPATIENT)
Dept: CARDIOLOGY | Facility: CLINIC | Age: 70
Discharge: HOME | End: 2025-06-23
Payer: COMMERCIAL

## 2025-06-23 ENCOUNTER — OFFICE VISIT (OUTPATIENT)
Dept: CARDIOLOGY | Facility: CLINIC | Age: 70
End: 2025-06-23
Payer: COMMERCIAL

## 2025-06-23 VITALS
HEART RATE: 72 BPM | SYSTOLIC BLOOD PRESSURE: 125 MMHG | WEIGHT: 185 LBS | OXYGEN SATURATION: 99 % | DIASTOLIC BLOOD PRESSURE: 80 MMHG | BODY MASS INDEX: 28.98 KG/M2

## 2025-06-23 DIAGNOSIS — R06.02 SHORTNESS OF BREATH: ICD-10-CM

## 2025-06-23 DIAGNOSIS — R07.9 CHEST PAIN, UNSPECIFIED TYPE: Primary | ICD-10-CM

## 2025-06-23 LAB
AORTIC VALVE MEAN GRADIENT: 6 MMHG
AORTIC VALVE PEAK VELOCITY: 1.75 M/S
AV PEAK GRADIENT: 12 MMHG
AVA (PEAK VEL): 2.77 CM2
AVA (VTI): 2.77 CM2
EJECTION FRACTION APICAL 4 CHAMBER: 60.9
EJECTION FRACTION: 63 %
LEFT ATRIUM VOLUME AREA LENGTH INDEX BSA: 33 ML/M2
LEFT VENTRICLE INTERNAL DIMENSION DIASTOLE: 4.63 CM (ref 3.5–6)
LEFT VENTRICULAR OUTFLOW TRACT DIAMETER: 2.11 CM
MITRAL VALVE E/A RATIO: 1.1
RIGHT VENTRICLE FREE WALL PEAK S': 12 CM/S
RIGHT VENTRICLE PEAK SYSTOLIC PRESSURE: 31 MMHG
TRICUSPID ANNULAR PLANE SYSTOLIC EXCURSION: 3 CM

## 2025-06-23 PROCEDURE — 1160F RVW MEDS BY RX/DR IN RCRD: CPT | Performed by: INTERNAL MEDICINE

## 2025-06-23 PROCEDURE — 99214 OFFICE O/P EST MOD 30 MIN: CPT | Performed by: INTERNAL MEDICINE

## 2025-06-23 PROCEDURE — 99212 OFFICE O/P EST SF 10 MIN: CPT | Mod: 25

## 2025-06-23 PROCEDURE — 1125F AMNT PAIN NOTED PAIN PRSNT: CPT | Performed by: INTERNAL MEDICINE

## 2025-06-23 PROCEDURE — 1036F TOBACCO NON-USER: CPT | Performed by: INTERNAL MEDICINE

## 2025-06-23 PROCEDURE — 3074F SYST BP LT 130 MM HG: CPT | Performed by: INTERNAL MEDICINE

## 2025-06-23 PROCEDURE — 1159F MED LIST DOCD IN RCRD: CPT | Performed by: INTERNAL MEDICINE

## 2025-06-23 PROCEDURE — 93306 TTE W/DOPPLER COMPLETE: CPT | Performed by: INTERNAL MEDICINE

## 2025-06-23 PROCEDURE — 3078F DIAST BP <80 MM HG: CPT | Performed by: INTERNAL MEDICINE

## 2025-06-23 PROCEDURE — 93306 TTE W/DOPPLER COMPLETE: CPT

## 2025-06-23 RX ORDER — AMINOPHYLLINE 25 MG/ML
125 INJECTION, SOLUTION INTRAVENOUS ONCE AS NEEDED
OUTPATIENT
Start: 2025-06-23

## 2025-06-23 RX ORDER — REGADENOSON 0.08 MG/ML
0.4 INJECTION, SOLUTION INTRAVENOUS
OUTPATIENT
Start: 2025-06-23

## 2025-06-23 ASSESSMENT — PAIN SCALES - GENERAL
PAINLEVEL_OUTOF10: 5
PAINLEVEL_OUTOF10: 5

## 2025-06-23 NOTE — PROGRESS NOTES
Subjective:  Patient returns for a routine 3-month follow-up.  He is generally doing reasonably well at this time.  He did have his echocardiogram done today to evaluate his dyspnea.  The final results are not available but preliminary review suggested no compelling myocardial or valvular disease.    He continues to have some occasional atypical chest pain spells now and then.  He does take the nitroglycerin and feels it might help on occasion.  He denies any prolonged or severe episodes of chest discomfort.    He is tolerating his atorvastatin well without side effect.  He overall is generally happy and comfortable with how he is doing.    Objective:  General: Alert, usual pleasant self.  HEENT: Unchanged.  Lungs: Clear without crackles or wheezing.  Cardiac: Distant heart tones without change.  Abdomen: Nontender.  Extremities: No edema.  Skin: No acute rash.  Neuro: Grossly intact and unchanged.    Lipid panel: Cholesterol-140, HDL-56, LDL-69, TG-72    LP(a)-39.    Impression/plan:  Duane unfortunately presents with some ongoing chest discomfort of concern.  Given his orthopedic limitations, I will plan on setting him up for a pharmacologic nuclear stress test just to make sure we are not missing any compelling coronary disease as the etiology for his chest pain syndrome.    His blood pressure remains in good range without need for antihypertensive therapy.    He has had a very nice response to low-dose atorvastatin, so we will continue this unchanged.    He did not need any prescriptions renewed.    I will see him back for routine follow-up in 4 months but will review his nuclear stress test results with him when they are available.    Patient instructions:    Continue current medications unchanged.    Report for your pharmacologic nuclear stress test when scheduled and call for results.    Return to clinic in 4 months.

## 2025-06-26 ENCOUNTER — TELEPHONE (OUTPATIENT)
Dept: UROLOGY | Facility: HOSPITAL | Age: 70
End: 2025-06-26
Payer: MEDICARE

## 2025-06-26 NOTE — TELEPHONE ENCOUNTER
Left pt message to call office back at [phone] to schedule [facility] with Dr. Zavala to review PSA results

## 2025-07-02 ENCOUNTER — APPOINTMENT (OUTPATIENT)
Dept: RADIOLOGY | Facility: CLINIC | Age: 70
End: 2025-07-02
Payer: MEDICARE

## 2025-07-02 ENCOUNTER — APPOINTMENT (OUTPATIENT)
Dept: CARDIOLOGY | Facility: CLINIC | Age: 70
End: 2025-07-02
Payer: MEDICARE

## 2025-07-17 ENCOUNTER — TELEPHONE (OUTPATIENT)
Dept: CARDIOLOGY | Facility: HOSPITAL | Age: 70
End: 2025-07-17

## 2025-07-18 ENCOUNTER — HOSPITAL ENCOUNTER (OUTPATIENT)
Dept: RADIOLOGY | Facility: HOSPITAL | Age: 70
Discharge: HOME | End: 2025-07-18
Payer: MEDICARE

## 2025-07-18 ENCOUNTER — HOSPITAL ENCOUNTER (OUTPATIENT)
Dept: CARDIOLOGY | Facility: HOSPITAL | Age: 70
Discharge: HOME | End: 2025-07-18
Payer: MEDICARE

## 2025-07-18 DIAGNOSIS — R07.9 CHEST PAIN, UNSPECIFIED TYPE: ICD-10-CM

## 2025-07-18 PROCEDURE — 93016 CV STRESS TEST SUPVJ ONLY: CPT

## 2025-07-18 PROCEDURE — A9502 TC99M TETROFOSMIN: HCPCS | Performed by: INTERNAL MEDICINE

## 2025-07-18 PROCEDURE — 93018 CV STRESS TEST I&R ONLY: CPT

## 2025-07-18 PROCEDURE — 93017 CV STRESS TEST TRACING ONLY: CPT

## 2025-07-18 PROCEDURE — 3430000001 HC RX 343 DIAGNOSTIC RADIOPHARMACEUTICALS: Performed by: INTERNAL MEDICINE

## 2025-07-18 PROCEDURE — 78452 HT MUSCLE IMAGE SPECT MULT: CPT

## 2025-07-18 RX ADMIN — TETROFOSMIN 11.2 MILLICURIE: 0.23 INJECTION, POWDER, LYOPHILIZED, FOR SOLUTION INTRAVENOUS at 09:52

## 2025-07-18 RX ADMIN — TETROFOSMIN 35.9 MILLICURIE: 0.23 INJECTION, POWDER, LYOPHILIZED, FOR SOLUTION INTRAVENOUS at 11:10

## 2025-07-30 ENCOUNTER — APPOINTMENT (OUTPATIENT)
Dept: OTOLARYNGOLOGY | Facility: HOSPITAL | Age: 70
End: 2025-07-30
Payer: COMMERCIAL

## 2025-08-28 ENCOUNTER — TELEMEDICINE (OUTPATIENT)
Dept: UROLOGY | Facility: HOSPITAL | Age: 70
End: 2025-08-28
Payer: MEDICARE

## 2025-08-28 DIAGNOSIS — N40.1 BENIGN PROSTATIC HYPERPLASIA WITH URINARY FREQUENCY: ICD-10-CM

## 2025-08-28 DIAGNOSIS — R35.0 BENIGN PROSTATIC HYPERPLASIA WITH URINARY FREQUENCY: ICD-10-CM

## 2025-08-28 DIAGNOSIS — R97.20 ELEVATED PSA: Primary | ICD-10-CM

## 2025-08-28 PROCEDURE — G2211 COMPLEX E/M VISIT ADD ON: HCPCS | Performed by: UROLOGY

## 2025-08-28 PROCEDURE — 99214 OFFICE O/P EST MOD 30 MIN: CPT | Performed by: UROLOGY

## 2025-11-12 ENCOUNTER — APPOINTMENT (OUTPATIENT)
Dept: OPHTHALMOLOGY | Facility: CLINIC | Age: 70
End: 2025-11-12
Payer: COMMERCIAL

## (undated) DEVICE — CONTAINER, PIRANHA, TISSUE

## (undated) DEVICE — SYRINGE, 60 CC, IRRIGATION, PISTON, CATH TIP, W/LUER ADAPTER,DISP

## (undated) DEVICE — COLLECTION BAG, FLUID, NON-STERILE

## (undated) DEVICE — EVACUATOR, BLADDER, ELLIK, PLASTIC

## (undated) DEVICE — KIT, PIRANHA,  OVERFLOW PROTECTOR/BACTERIA FILTER

## (undated) DEVICE — Device

## (undated) DEVICE — TUBING, CLEAR N-COND, 5MM X 10, LF

## (undated) DEVICE — BAG, DRAINAGE, ANTI-REFLUX CHAMBER, 2000ML

## (undated) DEVICE — CATHETER, FOLEY, 3WAY, 22FR, 30CC, LUBRI-SIL, LF

## (undated) DEVICE — CATHETER, IRRIGATION, CURITY, 22FR

## (undated) DEVICE — NEEDLE, INJETAK ADJUSTABLE TIP

## (undated) DEVICE — ADAPTER, Y TUBING STERILE

## (undated) DEVICE — BLADES, PIRANHA, STORZ

## (undated) DEVICE — IRRIGATION SET, CYSTOSCOPY, TURP, Y, CONTINUOUS, 81 IN

## (undated) DEVICE — CATHETER, SECURE DEVICE, URINARY, ADH

## (undated) DEVICE — SYRINGE, 50 CC, LUER LOCK

## (undated) DEVICE — SLEEVE, VASO PRESS, CALF GARMENT, MEDIUM, GREEN

## (undated) DEVICE — TUBING SET, PIRANHA

## (undated) DEVICE — PLUG, CATHETER

## (undated) DEVICE — CATHETER, LASER URETERAL, 7.1FR, 40CM